# Patient Record
Sex: FEMALE | Race: WHITE | NOT HISPANIC OR LATINO | URBAN - METROPOLITAN AREA
[De-identification: names, ages, dates, MRNs, and addresses within clinical notes are randomized per-mention and may not be internally consistent; named-entity substitution may affect disease eponyms.]

---

## 2020-04-15 ENCOUNTER — INPATIENT (INPATIENT)
Facility: HOSPITAL | Age: 80
LOS: 5 days | Discharge: NURSING, REHAB OR LONG-TERM CARE | End: 2020-04-21
Attending: INTERNAL MEDICINE | Admitting: INTERNAL MEDICINE
Payer: MEDICARE

## 2020-04-15 VITALS
HEART RATE: 71 BPM | RESPIRATION RATE: 22 BRPM | OXYGEN SATURATION: 96 % | SYSTOLIC BLOOD PRESSURE: 122 MMHG | DIASTOLIC BLOOD PRESSURE: 51 MMHG | TEMPERATURE: 98 F

## 2020-04-15 LAB
ALBUMIN SERPL ELPH-MCNC: 3.6 G/DL — SIGNIFICANT CHANGE UP (ref 3.5–5.2)
ALP SERPL-CCNC: 51 U/L — SIGNIFICANT CHANGE UP (ref 30–115)
ALT FLD-CCNC: <5 U/L — SIGNIFICANT CHANGE UP (ref 0–41)
ANION GAP SERPL CALC-SCNC: 10 MMOL/L — SIGNIFICANT CHANGE UP (ref 7–14)
AST SERPL-CCNC: 20 U/L — SIGNIFICANT CHANGE UP (ref 0–41)
BASOPHILS # BLD AUTO: 0.05 K/UL — SIGNIFICANT CHANGE UP (ref 0–0.2)
BASOPHILS NFR BLD AUTO: 0.4 % — SIGNIFICANT CHANGE UP (ref 0–1)
BILIRUB SERPL-MCNC: 0.2 MG/DL — SIGNIFICANT CHANGE UP (ref 0.2–1.2)
BUN SERPL-MCNC: 12 MG/DL — SIGNIFICANT CHANGE UP (ref 10–20)
CALCIUM SERPL-MCNC: 9.2 MG/DL — SIGNIFICANT CHANGE UP (ref 8.5–10.1)
CHLORIDE SERPL-SCNC: 102 MMOL/L — SIGNIFICANT CHANGE UP (ref 98–110)
CO2 SERPL-SCNC: 25 MMOL/L — SIGNIFICANT CHANGE UP (ref 17–32)
CREAT SERPL-MCNC: 0.8 MG/DL — SIGNIFICANT CHANGE UP (ref 0.7–1.5)
D DIMER BLD IA.RAPID-MCNC: 485 NG/ML DDU — HIGH (ref 0–230)
EOSINOPHIL # BLD AUTO: 0.18 K/UL — SIGNIFICANT CHANGE UP (ref 0–0.7)
EOSINOPHIL NFR BLD AUTO: 1.6 % — SIGNIFICANT CHANGE UP (ref 0–8)
GLUCOSE SERPL-MCNC: 80 MG/DL — SIGNIFICANT CHANGE UP (ref 70–99)
HCT VFR BLD CALC: 33.1 % — LOW (ref 37–47)
HGB BLD-MCNC: 10.9 G/DL — LOW (ref 12–16)
IMM GRANULOCYTES NFR BLD AUTO: 1 % — HIGH (ref 0.1–0.3)
LYMPHOCYTES # BLD AUTO: 1.54 K/UL — SIGNIFICANT CHANGE UP (ref 1.2–3.4)
LYMPHOCYTES # BLD AUTO: 13.8 % — LOW (ref 20.5–51.1)
MCHC RBC-ENTMCNC: 32 PG — HIGH (ref 27–31)
MCHC RBC-ENTMCNC: 32.9 G/DL — SIGNIFICANT CHANGE UP (ref 32–37)
MCV RBC AUTO: 97.1 FL — SIGNIFICANT CHANGE UP (ref 81–99)
MONOCYTES # BLD AUTO: 1.07 K/UL — HIGH (ref 0.1–0.6)
MONOCYTES NFR BLD AUTO: 9.6 % — HIGH (ref 1.7–9.3)
NEUTROPHILS # BLD AUTO: 8.22 K/UL — HIGH (ref 1.4–6.5)
NEUTROPHILS NFR BLD AUTO: 73.6 % — SIGNIFICANT CHANGE UP (ref 42.2–75.2)
NRBC # BLD: 0 /100 WBCS — SIGNIFICANT CHANGE UP (ref 0–0)
NT-PROBNP SERPL-SCNC: 100 PG/ML — SIGNIFICANT CHANGE UP (ref 0–300)
PLATELET # BLD AUTO: 410 K/UL — HIGH (ref 130–400)
POTASSIUM SERPL-MCNC: 5.4 MMOL/L — HIGH (ref 3.5–5)
POTASSIUM SERPL-SCNC: 5.4 MMOL/L — HIGH (ref 3.5–5)
PROT SERPL-MCNC: 6.5 G/DL — SIGNIFICANT CHANGE UP (ref 6–8)
RBC # BLD: 3.41 M/UL — LOW (ref 4.2–5.4)
RBC # FLD: 12.5 % — SIGNIFICANT CHANGE UP (ref 11.5–14.5)
SODIUM SERPL-SCNC: 137 MMOL/L — SIGNIFICANT CHANGE UP (ref 135–146)
TROPONIN T SERPL-MCNC: <0.01 NG/ML — SIGNIFICANT CHANGE UP
WBC # BLD: 11.17 K/UL — HIGH (ref 4.8–10.8)
WBC # FLD AUTO: 11.17 K/UL — HIGH (ref 4.8–10.8)

## 2020-04-15 PROCEDURE — 93010 ELECTROCARDIOGRAM REPORT: CPT

## 2020-04-15 PROCEDURE — 99223 1ST HOSP IP/OBS HIGH 75: CPT

## 2020-04-15 PROCEDURE — 99497 ADVNCD CARE PLAN 30 MIN: CPT | Mod: 25

## 2020-04-15 PROCEDURE — 71045 X-RAY EXAM CHEST 1 VIEW: CPT | Mod: 26

## 2020-04-15 PROCEDURE — 99285 EMERGENCY DEPT VISIT HI MDM: CPT

## 2020-04-15 RX ORDER — AMLODIPINE BESYLATE 2.5 MG/1
10 TABLET ORAL DAILY
Refills: 0 | Status: DISCONTINUED | OUTPATIENT
Start: 2020-04-15 | End: 2020-04-21

## 2020-04-15 RX ORDER — AZITHROMYCIN 500 MG/1
500 TABLET, FILM COATED ORAL ONCE
Refills: 0 | Status: COMPLETED | OUTPATIENT
Start: 2020-04-15 | End: 2020-04-15

## 2020-04-15 RX ORDER — LEVOTHYROXINE SODIUM 125 MCG
50 TABLET ORAL DAILY
Refills: 0 | Status: DISCONTINUED | OUTPATIENT
Start: 2020-04-15 | End: 2020-04-21

## 2020-04-15 RX ORDER — HYDROXYCHLOROQUINE SULFATE 200 MG
800 TABLET ORAL EVERY 24 HOURS
Refills: 0 | Status: COMPLETED | OUTPATIENT
Start: 2020-04-15 | End: 2020-04-15

## 2020-04-15 RX ORDER — HYDROXYCHLOROQUINE SULFATE 200 MG
TABLET ORAL
Refills: 0 | Status: DISCONTINUED | OUTPATIENT
Start: 2020-04-15 | End: 2020-04-15

## 2020-04-15 RX ORDER — PANTOPRAZOLE SODIUM 20 MG/1
40 TABLET, DELAYED RELEASE ORAL
Refills: 0 | Status: DISCONTINUED | OUTPATIENT
Start: 2020-04-15 | End: 2020-04-21

## 2020-04-15 RX ORDER — ARIPIPRAZOLE 15 MG/1
30 TABLET ORAL DAILY
Refills: 0 | Status: DISCONTINUED | OUTPATIENT
Start: 2020-04-15 | End: 2020-04-21

## 2020-04-15 RX ORDER — CEFTRIAXONE 500 MG/1
1000 INJECTION, POWDER, FOR SOLUTION INTRAMUSCULAR; INTRAVENOUS EVERY 24 HOURS
Refills: 0 | Status: DISCONTINUED | OUTPATIENT
Start: 2020-04-15 | End: 2020-04-17

## 2020-04-15 RX ORDER — AZITHROMYCIN 500 MG/1
500 TABLET, FILM COATED ORAL EVERY 24 HOURS
Refills: 0 | Status: DISCONTINUED | OUTPATIENT
Start: 2020-04-16 | End: 2020-04-16

## 2020-04-15 RX ORDER — ENOXAPARIN SODIUM 100 MG/ML
40 INJECTION SUBCUTANEOUS
Refills: 0 | Status: DISCONTINUED | OUTPATIENT
Start: 2020-04-15 | End: 2020-04-15

## 2020-04-15 RX ORDER — CHLORHEXIDINE GLUCONATE 213 G/1000ML
1 SOLUTION TOPICAL
Refills: 0 | Status: DISCONTINUED | OUTPATIENT
Start: 2020-04-15 | End: 2020-04-21

## 2020-04-15 RX ORDER — ENOXAPARIN SODIUM 100 MG/ML
40 INJECTION SUBCUTANEOUS DAILY
Refills: 0 | Status: DISCONTINUED | OUTPATIENT
Start: 2020-04-15 | End: 2020-04-21

## 2020-04-15 RX ORDER — HYDROXYCHLOROQUINE SULFATE 200 MG
TABLET ORAL
Refills: 0 | Status: DISCONTINUED | OUTPATIENT
Start: 2020-04-15 | End: 2020-04-16

## 2020-04-15 RX ORDER — HYDROXYCHLOROQUINE SULFATE 200 MG
400 TABLET ORAL EVERY 24 HOURS
Refills: 0 | Status: DISCONTINUED | OUTPATIENT
Start: 2020-04-16 | End: 2020-04-16

## 2020-04-15 RX ORDER — LITHIUM CARBONATE 300 MG/1
150 TABLET, EXTENDED RELEASE ORAL DAILY
Refills: 0 | Status: DISCONTINUED | OUTPATIENT
Start: 2020-04-15 | End: 2020-04-21

## 2020-04-15 RX ORDER — LAMOTRIGINE 25 MG/1
25 TABLET, ORALLY DISINTEGRATING ORAL DAILY
Refills: 0 | Status: DISCONTINUED | OUTPATIENT
Start: 2020-04-15 | End: 2020-04-21

## 2020-04-15 RX ORDER — HYDROXYCHLOROQUINE SULFATE 200 MG
800 TABLET ORAL EVERY 24 HOURS
Refills: 0 | Status: DISCONTINUED | OUTPATIENT
Start: 2020-04-15 | End: 2020-04-15

## 2020-04-15 RX ORDER — AZITHROMYCIN 500 MG/1
TABLET, FILM COATED ORAL
Refills: 0 | Status: DISCONTINUED | OUTPATIENT
Start: 2020-04-15 | End: 2020-04-16

## 2020-04-15 RX ORDER — BUDESONIDE AND FORMOTEROL FUMARATE DIHYDRATE 160; 4.5 UG/1; UG/1
2 AEROSOL RESPIRATORY (INHALATION)
Refills: 0 | Status: DISCONTINUED | OUTPATIENT
Start: 2020-04-15 | End: 2020-04-21

## 2020-04-15 RX ADMIN — Medication 800 MILLIGRAM(S): at 21:42

## 2020-04-15 RX ADMIN — AZITHROMYCIN 255 MILLIGRAM(S): 500 TABLET, FILM COATED ORAL at 23:00

## 2020-04-15 RX ADMIN — CEFTRIAXONE 100 MILLIGRAM(S): 500 INJECTION, POWDER, FOR SOLUTION INTRAMUSCULAR; INTRAVENOUS at 21:25

## 2020-04-15 RX ADMIN — BUDESONIDE AND FORMOTEROL FUMARATE DIHYDRATE 2 PUFF(S): 160; 4.5 AEROSOL RESPIRATORY (INHALATION) at 21:42

## 2020-04-15 NOTE — ED PROVIDER NOTE - NS ED ROS FT
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

## 2020-04-15 NOTE — H&P ADULT - HISTORY OF PRESENT ILLNESS
78 yo female from [facility] PMH multiple problems including  COPD not on home o2, IBD, GERD, dementia, bipolar, schizophrenia, hypothyroidism, HLD, constipation, insomnia, anemia BIBEMS for evaluation of SOB and " gasping for air" for several days.  Patient reports symptoms at rest and on ambulation.  Denies fever, chills cough, CP, back or abdominal pain, no diarrhea or urinary complaints, no leg pain or swelling.

## 2020-04-15 NOTE — GOALS OF CARE CONVERSATION - ADVANCED CARE PLANNING - CONVERSATION DETAILS
Goals of care discussed in light of patient's comorbidities and presenting symptoms, patient wishes to be full code, agreeable to intubation/mechanical ventilation should respiratory status decompensate. Patient is alert and oriented x4, unable to contact patient's niece Skye Verma, no contact number listed.

## 2020-04-15 NOTE — H&P ADULT - ATTENDING COMMENTS
PHYSICAL EXAM:    CONSTITUTIONAL: NAD,   ENMT: EOMI, PERRLA, No tonsillar erythema, exudates, or enlargement, neck supple, No JVD  PSYCH: Alert & Oriented X3, denies suicidal or homicidal ideation, however expresses paranoid delusion stating "many people" are "out to" get her in light of her wealth (states she is a millionaire)   RESPIRATORY: Left basilar rales, negative wheezing   CARDIOVASCULAR: Regular rate and rhythm; No murmurs, rubs, or gallops, negative edema  GASTROINTESTINAL: Soft, Nontender, Nondistended; Bowel sounds present  EXTREMITIES:  2+ Peripheral Pulses, No clubbing, cyanosis  SKIN: No rashes or lesions    80 yo female from [facility] PMH multiple problems including  COPD not on home o2, IBD, GERD, dementia, bipolar, schizophrenia, hypothyroidism, HLD, constipation, insomnia, anemia BIBEMS for evaluation of SOB and " gasping for air" for several days.  Patient reports symptoms at rest and on ambulation.  Denies fever, chills cough, CP, back or abdominal pain, no diarrhea or urinary complaints, no leg pain or swelling.    80 yo female from [facility] PMH multiple problems including  COPD not on home o2, IBD, GERD, dementia, bipolar, schizophrenia, hypothyroidism, HLD, constipation, insomnia, anemia BIBEMS for evaluation of SOB and " gasping for air" for several days.    # Suspected COVID 19 infection  - f/u covid 19 swab  - L basilar opacity on cxr - ? bacterial pneumonia ? will await PCT level prior to initiating antibiotics  - start plaquenil ?    # PMH of COPD not on home o2, IBD, GERD, dementia, bipolar, schizophrenia, hypothyroidism, HLD, constipation, insomnia, anemia  - c/w home meds    # DVTppx: Lovenox  # GI ppx: PPI  # Full code  # Admit to non-icu covid unit PHYSICAL EXAM:    CONSTITUTIONAL: NAD,   ENMT: EOMI, PERRLA, No tonsillar erythema, exudates, or enlargement, neck supple, No JVD  PSYCH: Alert & Oriented X3, denies suicidal or homicidal ideation, however expresses paranoid delusion stating "many people" are "out to" get her in light of her wealth (states she is a millionaire)   RESPIRATORY: Left basilar rales, negative wheezing   CARDIOVASCULAR: Regular rate and rhythm; No murmurs, rubs, or gallops, negative edema  GASTROINTESTINAL: Soft, Nontender, Nondistended; Bowel sounds present  EXTREMITIES:  2+ Peripheral Pulses, No clubbing, cyanosis  SKIN: No rashes or lesions    78 yo F, from [facility], with PMHx of COPD not on home oxygen, IBD, GERD, Dementia?, Bipolar, Schizophrenia, Hypothyroidism, Insomnia, Chronic Normocytic Anemia brought in from facility with complaint of shortness of breath and noted episodes of patient "gasping for air" for few days duration. Patient comfortable on room air, however becomes dyspneic on ambulation. Patient denies fever, chills, nausea, vomiting, cough, chest pain, abdominal pain, known sick contacts or recent travel. 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      #Suspected Viral Pneumonia vs. gram negative HCAP: COVID-19 sent, patient remains comfortable and saturating 97% on room air, able to speak in full and complete sentences with no sign of labored breathing, CXR revealing of left basilar opacity, positive leukocytosis, afebrile, continue Ceftriaxone, Azithromycin, started on Hydroxychlorquine EKG NSR QTc 411, incentive spirometry, pulmonary toilet, f/u immune hyperactivation panel, urine legionella, and strep antigen    #COPD no sign of concurrent exacerbation, GERD, Bipolar, Schizophrenia, Hypothyroidism/HLD/Constipation/Insomnia: Continue home medications      Disposition: Return to Assisted Living Facility once medically stable.

## 2020-04-15 NOTE — ED PROVIDER NOTE - CLINICAL SUMMARY MEDICAL DECISION MAKING FREE TEXT BOX
79n yo female with SOB for several days, symptoms suspicious for COVID, saturating well on RA, but becomes dyspneic on ambulation.  Admit.

## 2020-04-15 NOTE — ED ADULT NURSE NOTE - OBJECTIVE STATEMENT
78 y/o female BIBA from [facility] for shortness of breat with difficulty ambulating. patient states she has been short of breath for 4 days with increased swelling to bilateral ankles. patient has a hx of schizophrenia appears to be having visual hallucinations at this time "There are taser in my room hitting my ankle to make it blow up". patient is not suicidal or homicidal at this time. patient denies chest pain, dizziness, headache, fever, chills, abdominal pain.

## 2020-04-15 NOTE — ED PROVIDER NOTE - PHYSICAL EXAMINATION
Vital Signs: I have reviewed the initial vital signs.  Constitutional: well-nourished, appears stated age, no acute distress  HEENT: PERRL, pink conjunctivae  Cardiovascular: regular rate, regular rhythm, well-perfused extremities, distal pulses intact  Respiratory: unlabored respiratory effort, equal air entry b/l, inspiratory crackles bilaterally  Gastrointestinal: soft, non-tender abdomen, no pulsatile or palpable mass, no CVA ttp  Musculoskeletal: supple neck, no lower extremity edema or calf ttp, no midline spine ttp  Integumentary: warm, dry, no rash  Neurologic: awake, alert x3, no gross neuro deficits  Psychiatric: appropriate mood, appropriate affect, somewhat unkempt appearance, see ROS for delusions

## 2020-04-15 NOTE — ED ADULT NURSE NOTE - PMH
Anemia    Bipolar 1 disorder    COPD (chronic obstructive pulmonary disease)    Dementia    GERD (gastroesophageal reflux disease)    Hypercholesterolemia    Hypothyroid    Insomnia    Schizophrenia

## 2020-04-15 NOTE — ED ADULT NURSE NOTE - NSIMPLEMENTINTERV_GEN_ALL_ED
Implemented All Fall with Harm Risk Interventions:  [place] to call system. Call bell, personal items and telephone within reach. Instruct patient to call for assistance. Room bathroom lighting operational. Non-slip footwear when patient is off stretcher. Physically safe environment: no spills, clutter or unnecessary equipment. Stretcher in lowest position, wheels locked, appropriate side rails in place. Provide visual cue, wrist band, yellow gown, etc. Monitor gait and stability. Monitor for mental status changes and reorient to person, place, and time. Review medications for side effects contributing to fall risk. Reinforce activity limits and safety measures with patient and family. Provide visual clues: red socks.

## 2020-04-15 NOTE — H&P ADULT - NSHPLABSRESULTS_GEN_ALL_CORE
LABS:                        10.9   11.17 )-----------( 410      ( 15 Apr 2020 14:20 )             33.1     04-15    137  |  102  |  12  ----------------------------<  80  5.4<H>   |  25  |  0.8    Ca    9.2      15 Apr 2020 14:20    TPro  6.5  /  Alb  3.6  /  TBili  0.2  /  DBili  x   /  AST  20  /  ALT  <5  /  AlkPhos  51  04-15          Troponin T, Serum: <0.01 ng/mL (04-15-20 @ 14:20)      CARDIAC MARKERS ( 15 Apr 2020 14:20 )  x     / <0.01 ng/mL / x     / x     / x          RADIOLOGY:

## 2020-04-15 NOTE — ED ADULT NURSE NOTE - CHIEF COMPLAINT QUOTE
Pt presents to ED KATY from [facility] for c/o increasing SOB on exertion x 1 week, BLLE swelling noted. Pt denies fevers or coughs.

## 2020-04-15 NOTE — ED ADULT TRIAGE NOTE - CHIEF COMPLAINT QUOTE
Pt presents to ED KATY from [facility] for c/o increasing SOB on exertion x 1 week, BLLE swelling noted. Pt denies fevers or coughs.

## 2020-04-15 NOTE — H&P ADULT - NSICDXPASTMEDICALHX_GEN_ALL_CORE_FT
PAST MEDICAL HISTORY:  Anemia     Bipolar 1 disorder     COPD (chronic obstructive pulmonary disease)     Dementia     GERD (gastroesophageal reflux disease)     Hypercholesterolemia     Hypothyroid     Insomnia     Schizophrenia

## 2020-04-15 NOTE — H&P ADULT - ASSESSMENT
78 yo female from [facility] PMH multiple problems including  COPD not on home o2, IBD, GERD, dementia, bipolar, schizophrenia, hypothyroidism, HLD, constipation, insomnia, anemia BIBEMS for evaluation of SOB and " gasping for air" for several days.    # Suspected COVID 19 infection  - f/u covid 19 swab  - L basilar opacity on cxr - ? bacterial pneumonia ? will await PCT level prior to initiating antibiotics  - start plaquenil ?    # PMH of COPD not on home o2, IBD, GERD, dementia, bipolar, schizophrenia, hypothyroidism, HLD, constipation, insomnia, anemia  - c/w home meds    # DVTppx: Lovenox  # GI ppx: PPI  # Full code  # Admit to non-icu covid unit

## 2020-04-15 NOTE — ED PROVIDER NOTE - OBJECTIVE STATEMENT
78 yo female from [facility] PMH multiple problems including  COPD not on home o2, IBD, GERD, dementia, bipolar, schizophrenia, hypothyroidism, HLD, constipation, insomnia, anemia BIBEMS for evaluation of SOB and " gasping for air" for several days.  Patient reports symptoms at rest and on ambulation.  Denies fever, chills cough, CP, back or abdominal pain, no diarrhea or urinary complaints, no leg pain or swelling.  Will get CXR, check labs, ECG, COVID testing, dispo accordingly.

## 2020-04-16 LAB
ALBUMIN SERPL ELPH-MCNC: 3.5 G/DL — SIGNIFICANT CHANGE UP (ref 3.5–5.2)
ALP SERPL-CCNC: 54 U/L — SIGNIFICANT CHANGE UP (ref 30–115)
ALT FLD-CCNC: <5 U/L — SIGNIFICANT CHANGE UP (ref 0–41)
ANION GAP SERPL CALC-SCNC: 11 MMOL/L — SIGNIFICANT CHANGE UP (ref 7–14)
AST SERPL-CCNC: 12 U/L — SIGNIFICANT CHANGE UP (ref 0–41)
BASOPHILS # BLD AUTO: 0.04 K/UL — SIGNIFICANT CHANGE UP (ref 0–0.2)
BASOPHILS NFR BLD AUTO: 0.3 % — SIGNIFICANT CHANGE UP (ref 0–1)
BILIRUB SERPL-MCNC: <0.2 MG/DL — SIGNIFICANT CHANGE UP (ref 0.2–1.2)
BUN SERPL-MCNC: 11 MG/DL — SIGNIFICANT CHANGE UP (ref 10–20)
CALCIUM SERPL-MCNC: 9.2 MG/DL — SIGNIFICANT CHANGE UP (ref 8.5–10.1)
CHLORIDE SERPL-SCNC: 105 MMOL/L — SIGNIFICANT CHANGE UP (ref 98–110)
CO2 SERPL-SCNC: 25 MMOL/L — SIGNIFICANT CHANGE UP (ref 17–32)
CREAT SERPL-MCNC: 0.8 MG/DL — SIGNIFICANT CHANGE UP (ref 0.7–1.5)
CRP SERPL-MCNC: 0.17 MG/DL — SIGNIFICANT CHANGE UP (ref 0–0.4)
EOSINOPHIL # BLD AUTO: 0.25 K/UL — SIGNIFICANT CHANGE UP (ref 0–0.7)
EOSINOPHIL NFR BLD AUTO: 2 % — SIGNIFICANT CHANGE UP (ref 0–8)
FERRITIN SERPL-MCNC: 445 NG/ML — HIGH (ref 15–150)
GLUCOSE SERPL-MCNC: 93 MG/DL — SIGNIFICANT CHANGE UP (ref 70–99)
HCT VFR BLD CALC: 34.1 % — LOW (ref 37–47)
HGB BLD-MCNC: 10.9 G/DL — LOW (ref 12–16)
IMM GRANULOCYTES NFR BLD AUTO: 1 % — HIGH (ref 0.1–0.3)
LYMPHOCYTES # BLD AUTO: 16 % — LOW (ref 20.5–51.1)
LYMPHOCYTES # BLD AUTO: 2 K/UL — SIGNIFICANT CHANGE UP (ref 1.2–3.4)
MCHC RBC-ENTMCNC: 31 PG — SIGNIFICANT CHANGE UP (ref 27–31)
MCHC RBC-ENTMCNC: 32 G/DL — SIGNIFICANT CHANGE UP (ref 32–37)
MCV RBC AUTO: 96.9 FL — SIGNIFICANT CHANGE UP (ref 81–99)
MONOCYTES # BLD AUTO: 1.1 K/UL — HIGH (ref 0.1–0.6)
MONOCYTES NFR BLD AUTO: 8.8 % — SIGNIFICANT CHANGE UP (ref 1.7–9.3)
NEUTROPHILS # BLD AUTO: 9.01 K/UL — HIGH (ref 1.4–6.5)
NEUTROPHILS NFR BLD AUTO: 71.9 % — SIGNIFICANT CHANGE UP (ref 42.2–75.2)
NRBC # BLD: 0 /100 WBCS — SIGNIFICANT CHANGE UP (ref 0–0)
PLATELET # BLD AUTO: 457 K/UL — HIGH (ref 130–400)
POTASSIUM SERPL-MCNC: 4 MMOL/L — SIGNIFICANT CHANGE UP (ref 3.5–5)
POTASSIUM SERPL-SCNC: 4 MMOL/L — SIGNIFICANT CHANGE UP (ref 3.5–5)
PROCALCITONIN SERPL-MCNC: 0.05 NG/ML — SIGNIFICANT CHANGE UP (ref 0.02–0.1)
PROT SERPL-MCNC: 6.1 G/DL — SIGNIFICANT CHANGE UP (ref 6–8)
RBC # BLD: 3.52 M/UL — LOW (ref 4.2–5.4)
RBC # FLD: 12.5 % — SIGNIFICANT CHANGE UP (ref 11.5–14.5)
SARS-COV-2 RNA SPEC QL NAA+PROBE: DETECTED
SODIUM SERPL-SCNC: 141 MMOL/L — SIGNIFICANT CHANGE UP (ref 135–146)
WBC # BLD: 12.53 K/UL — HIGH (ref 4.8–10.8)
WBC # FLD AUTO: 12.53 K/UL — HIGH (ref 4.8–10.8)

## 2020-04-16 PROCEDURE — 99233 SBSQ HOSP IP/OBS HIGH 50: CPT

## 2020-04-16 RX ORDER — HYDROXYCHLOROQUINE SULFATE 200 MG
400 TABLET ORAL EVERY 24 HOURS
Refills: 0 | Status: COMPLETED | OUTPATIENT
Start: 2020-04-16 | End: 2020-04-19

## 2020-04-16 RX ORDER — AZITHROMYCIN 500 MG/1
250 TABLET, FILM COATED ORAL DAILY
Refills: 0 | Status: DISCONTINUED | OUTPATIENT
Start: 2020-04-16 | End: 2020-04-16

## 2020-04-16 RX ADMIN — CEFTRIAXONE 100 MILLIGRAM(S): 500 INJECTION, POWDER, FOR SOLUTION INTRAMUSCULAR; INTRAVENOUS at 19:58

## 2020-04-16 RX ADMIN — ENOXAPARIN SODIUM 40 MILLIGRAM(S): 100 INJECTION SUBCUTANEOUS at 14:26

## 2020-04-16 RX ADMIN — CHLORHEXIDINE GLUCONATE 1 APPLICATION(S): 213 SOLUTION TOPICAL at 05:51

## 2020-04-16 RX ADMIN — Medication 400 MILLIGRAM(S): at 14:47

## 2020-04-16 RX ADMIN — PANTOPRAZOLE SODIUM 40 MILLIGRAM(S): 20 TABLET, DELAYED RELEASE ORAL at 05:53

## 2020-04-16 RX ADMIN — Medication 50 MICROGRAM(S): at 06:27

## 2020-04-16 RX ADMIN — AMLODIPINE BESYLATE 10 MILLIGRAM(S): 2.5 TABLET ORAL at 05:51

## 2020-04-16 RX ADMIN — BUDESONIDE AND FORMOTEROL FUMARATE DIHYDRATE 2 PUFF(S): 160; 4.5 AEROSOL RESPIRATORY (INHALATION) at 19:58

## 2020-04-16 RX ADMIN — LITHIUM CARBONATE 150 MILLIGRAM(S): 300 TABLET, EXTENDED RELEASE ORAL at 14:25

## 2020-04-16 RX ADMIN — BUDESONIDE AND FORMOTEROL FUMARATE DIHYDRATE 2 PUFF(S): 160; 4.5 AEROSOL RESPIRATORY (INHALATION) at 14:27

## 2020-04-16 RX ADMIN — LAMOTRIGINE 25 MILLIGRAM(S): 25 TABLET, ORALLY DISINTEGRATING ORAL at 14:24

## 2020-04-16 RX ADMIN — ARIPIPRAZOLE 30 MILLIGRAM(S): 15 TABLET ORAL at 14:24

## 2020-04-16 NOTE — PROGRESS NOTE ADULT - SUBJECTIVE AND OBJECTIVE BOX
JB OLIVEIRA  79y  Female      Patient is a 79y old  Female who presents with a chief complaint of Shortness of breath (16 Apr 2020 09:04)      INTERVAL HPI/OVERNIGHT EVENTS:  She feels better, still with cough and SOB.   Vital Signs Last 24 Hrs  T(C): 36.7 (16 Apr 2020 13:11), Max: 37.3 (15 Apr 2020 21:30)  T(F): 98.1 (16 Apr 2020 13:11), Max: 99.2 (15 Apr 2020 21:30)  HR: 91 (16 Apr 2020 13:11) (66 - 91)  BP: 108/71 (16 Apr 2020 13:11) (101/53 - 137/64)  BP(mean): --  RR: 18 (16 Apr 2020 13:11) (17 - 20)  SpO2: 95% (16 Apr 2020 05:30) (94% - 99%)      04-16-20 @ 07:01  -  04-16-20 @ 15:01  --------------------------------------------------------  IN: 210 mL / OUT: 0 mL / NET: 210 mL            Consultant(s) Notes Reviewed:  [x ] YES  [ ] NO          MEDICATIONS  (STANDING):  amLODIPine   Tablet 10 milliGRAM(s) Oral daily  ARIPiprazole 30 milliGRAM(s) Oral daily  budesonide 160 MICROgram(s)/formoterol 4.5 MICROgram(s) Inhaler 2 Puff(s) Inhalation two times a day  cefTRIAXone   IVPB 1000 milliGRAM(s) IV Intermittent every 24 hours  chlorhexidine 4% Liquid 1 Application(s) Topical <User Schedule>  enoxaparin Injectable 40 milliGRAM(s) SubCutaneous daily  hydroxychloroquine 400 milliGRAM(s) Oral every 24 hours  lamoTRIgine 25 milliGRAM(s) Oral daily  levothyroxine 50 MICROGram(s) Oral daily  lithium 150 milliGRAM(s) Oral daily  pantoprazole    Tablet 40 milliGRAM(s) Oral before breakfast    MEDICATIONS  (PRN):      LABS                          10.9   12.53 )-----------( 457      ( 16 Apr 2020 05:40 )             34.1     04-16    141  |  105  |  11  ----------------------------<  93  4.0   |  25  |  0.8    Ca    9.2      16 Apr 2020 05:40    TPro  6.1  /  Alb  3.5  /  TBili  <0.2  /  DBili  x   /  AST  12  /  ALT  <5  /  AlkPhos  54  04-16          Lactate Trend    CARDIAC MARKERS ( 15 Apr 2020 14:20 )  x     / <0.01 ng/mL / x     / x     / x          CAPILLARY BLOOD GLUCOSE            RADIOLOGY & ADDITIONAL TESTS:    Imaging Personally Reviewed:  [ ] YES  [ ] NO    HEALTH ISSUES - PROBLEM Dx:        PHYSICAL EXAM:  GENERAL: NAD, well-developed  HEAD:  Atraumatic, Normocephalic  EYES: EOMI, PERRLA, conjunctiva and sclera clear  NECK: Supple, No JVD  CHEST/LUNG: bilateral rales.   HEART: Regular rate and rhythm; S1 S2  ABDOMEN: Soft, Nontender, Nondistended; Bowel sounds present  EXTREMITIES:  2+ Peripheral Pulses, No clubbing, cyanosis, or edema  PSYCH: AAOx3  NEUROLOGY: non-focal  SKIN: No rashes or lesions

## 2020-04-16 NOTE — PROGRESS NOTE ADULT - ASSESSMENT
80 yo female from [facility] assisted living facility PMH multiple problems including  COPD not on home o2, IBD, GERD, dementia, bipolar, schizophrenia, hypothyroidism, HLD, constipation, insomnia, anemia BIBEMS for evaluation of SOB and " gasping for air" for several days.    A/P:   Pneumonia: to rule out COVI-19  No hypoxia.   CXR showed left basilar opacity.   Continue Plaquenil, Ceftriaxone, If negative will treat as community acquired pneumonia. (add Zithromax).       COPD: stable, no wheezing  Continue Symbicort. Albuterol MDI as needed.       Bipolar, Schizophrenia  Continue Abilify, Lamotrigine and Lithium.     Hypothyroidism  Continue Synthroid.      DVTppx: Lovenox  GI ppx: PPI  Full code    #Progress Note Handoff:  Pending (specify):  Covid result.   Family discussion:  Disposition: psych facility

## 2020-04-16 NOTE — PROGRESS NOTE ADULT - ASSESSMENT
78 yo female from [facility] PMH multiple problems including  COPD not on home o2, IBD, GERD, dementia, bipolar, schizophrenia, hypothyroidism, HLD, constipation, insomnia, anemia BIBEMS for evaluation of SOB and " gasping for air" for several days.    # Suspected COVID 19 infection  - patient currently saturating SPO2 96% room air  - f/u covid 19 swab  - CXR: with left basilar opacity  - elevated wbc, thrombocytosis, no lymphopenia  - Procalcitonin 0.05, CRP 0.17, ferritin 445      # PMH of COPD not on home o2, IBD, GERD, dementia, bipolar, schizophrenia, hypothyroidism, HLD, constipation, insomnia, anemia  - c/w home meds    # DVTppx: Lovenox  # GI ppx: PPI  # Full code  # Admit to non-icu covid unit 78 yo female from [facility] PMH multiple problems including  COPD not on home o2, IBD, GERD, dementia, bipolar, schizophrenia, hypothyroidism, HLD, constipation, insomnia, anemia BIBEMS for evaluation of SOB and " gasping for air" for several days.     Suspected COVID 19 infection  - patient currently saturating SPO2 96% room air  - f/u Covid 19 swab  - CXR: with left basilar opacity  - elevated wbc, thrombocytosis, no lymphopenia  - Procalcitonin 0.05, CRP 0.17, ferritin 445  - , Plaquenil 1/5  - continue with ceftriaxone  - tylenol PRN for fevers  - c/w tessalon  - f/u urinalysis, blood culture    PMH of COPD, stable  - not on home o2  - c/w SYMBICORT    IBD, GERD  - c/w ppx    Bipolar, Schizophrenia  - c/w Abilify   - c/w lamotrigine  - c/w lithium    Hypothyroidism  - c/w syntheroid     HLD,   - c/w statin    Constipation  - c/w bowel regimen   Insomnia, anemia  - c/w home meds    DVTppx: Lovenox  GI ppx: PPI  Full code

## 2020-04-16 NOTE — PROGRESS NOTE ADULT - SUBJECTIVE AND OBJECTIVE BOX
JB OLIVEIRA 79y Female  MRN#: [id number]   CODE STATUS:________      SUBJECTIVE  Patient is a 79y old Female who presents with a chief complaint of Shortness of breath (15 Apr 2020 18:39)  Currently admitted to medicine with the primary diagnosis of Shortness of breath    Today is hospital day 1d, and this morning she is resting in bed, alert and oriented x2, claims that she wants to go home and has "important matters to take care of". Patient currently on room air with appropriate saturation, hemodynamically stable, denies any acute dyspnea, nausea, vomiting, or abdominal discomfort.       OBJECTIVE  PAST MEDICAL & SURGICAL HISTORY  Anemia  Insomnia  Hypercholesterolemia  Dementia  Hypothyroid  GERD (gastroesophageal reflux disease)  COPD (chronic obstructive pulmonary disease)  Schizophrenia  Bipolar 1 disorder    ALLERGIES:  Milk (Unknown)  Risperdal (Unknown)  risperidone (Unknown)    MEDICATIONS:  STANDING MEDICATIONS  amLODIPine   Tablet 10 milliGRAM(s) Oral daily  ARIPiprazole 30 milliGRAM(s) Oral daily  azithromycin  IVPB      azithromycin  IVPB 500 milliGRAM(s) IV Intermittent every 24 hours  budesonide 160 MICROgram(s)/formoterol 4.5 MICROgram(s) Inhaler 2 Puff(s) Inhalation two times a day  cefTRIAXone   IVPB 1000 milliGRAM(s) IV Intermittent every 24 hours  chlorhexidine 4% Liquid 1 Application(s) Topical <User Schedule>  enoxaparin Injectable 40 milliGRAM(s) SubCutaneous daily  hydroxychloroquine 400 milliGRAM(s) Oral every 24 hours  lamoTRIgine 25 milliGRAM(s) Oral daily  levothyroxine 50 MICROGram(s) Oral daily  lithium 150 milliGRAM(s) Oral daily  pantoprazole    Tablet 40 milliGRAM(s) Oral before breakfast    PRN MEDICATIONS      VITAL SIGNS: Last 24 Hours  T(C): 36 (16 Apr 2020 05:30), Max: 37.3 (15 Apr 2020 21:30)  T(F): 96.8 (16 Apr 2020 05:30), Max: 99.2 (15 Apr 2020 21:30)  HR: 80 (16 Apr 2020 05:30) (66 - 80)  BP: 130/60 (16 Apr 2020 05:30) (101/53 - 137/64)  BP(mean): --  RR: 18 (16 Apr 2020 05:30) (17 - 22)  SpO2: 95% (16 Apr 2020 05:30) (94% - 99%)    LABS:                        10.9   12.53 )-----------( 457      ( 16 Apr 2020 05:40 )             34.1     04-16    141  |  105  |  11  ----------------------------<  93  4.0   |  25  |  0.8    Ca    9.2      16 Apr 2020 05:40    TPro  6.1  /  Alb  3.5  /  TBili  <0.2  /  DBili  x   /  AST  12  /  ALT  <5  /  AlkPhos  54  04-16          Troponin T, Serum: <0.01 ng/mL (04-15-20 @ 14:20)      CARDIAC MARKERS ( 15 Apr 2020 14:20 )  x     / <0.01 ng/mL / x     / x     / x            PHYSICAL EXAM:    GENERAL: NAD, well-developed, AAOx2  HEENT:  Atraumatic, Normocephalic.   PULMONARY: decreased bibasilar BS No wheeze  CARDIOVASCULAR: Regular rate and rhythm; No murmurs, rubs, or gallops  GASTROINTESTINAL: Soft, Nontender, Nondistended; Bowel sounds present  MUSCULOSKELETAL:  2+ Peripheral Pulses, No edema  NEUROLOGY: non-focal, alert, tangental thinking, grandiose

## 2020-04-16 NOTE — PHYSICAL THERAPY INITIAL EVALUATION ADULT - GENERAL OBSERVATIONS, REHAB EVAL
Pt IE completed 10:20-10:50. Pt agreeable to IE. Pt in bed with HOB slightly elevated on room room spO2=92%. Pt in NAD.

## 2020-04-17 LAB
ANION GAP SERPL CALC-SCNC: 11 MMOL/L — SIGNIFICANT CHANGE UP (ref 7–14)
BASOPHILS # BLD AUTO: 0.05 K/UL — SIGNIFICANT CHANGE UP (ref 0–0.2)
BASOPHILS NFR BLD AUTO: 0.4 % — SIGNIFICANT CHANGE UP (ref 0–1)
BUN SERPL-MCNC: 8 MG/DL — LOW (ref 10–20)
CALCIUM SERPL-MCNC: 9.3 MG/DL — SIGNIFICANT CHANGE UP (ref 8.5–10.1)
CHLORIDE SERPL-SCNC: 108 MMOL/L — SIGNIFICANT CHANGE UP (ref 98–110)
CO2 SERPL-SCNC: 23 MMOL/L — SIGNIFICANT CHANGE UP (ref 17–32)
CREAT SERPL-MCNC: 0.8 MG/DL — SIGNIFICANT CHANGE UP (ref 0.7–1.5)
EOSINOPHIL # BLD AUTO: 0.22 K/UL — SIGNIFICANT CHANGE UP (ref 0–0.7)
EOSINOPHIL NFR BLD AUTO: 1.9 % — SIGNIFICANT CHANGE UP (ref 0–8)
GLUCOSE SERPL-MCNC: 95 MG/DL — SIGNIFICANT CHANGE UP (ref 70–99)
HCT VFR BLD CALC: 34.2 % — LOW (ref 37–47)
HGB BLD-MCNC: 11.1 G/DL — LOW (ref 12–16)
IMM GRANULOCYTES NFR BLD AUTO: 1.4 % — HIGH (ref 0.1–0.3)
LYMPHOCYTES # BLD AUTO: 17.3 % — LOW (ref 20.5–51.1)
LYMPHOCYTES # BLD AUTO: 2.05 K/UL — SIGNIFICANT CHANGE UP (ref 1.2–3.4)
MAGNESIUM SERPL-MCNC: 2.2 MG/DL — SIGNIFICANT CHANGE UP (ref 1.8–2.4)
MCHC RBC-ENTMCNC: 31.5 PG — HIGH (ref 27–31)
MCHC RBC-ENTMCNC: 32.5 G/DL — SIGNIFICANT CHANGE UP (ref 32–37)
MCV RBC AUTO: 97.2 FL — SIGNIFICANT CHANGE UP (ref 81–99)
MONOCYTES # BLD AUTO: 0.86 K/UL — HIGH (ref 0.1–0.6)
MONOCYTES NFR BLD AUTO: 7.3 % — SIGNIFICANT CHANGE UP (ref 1.7–9.3)
NEUTROPHILS # BLD AUTO: 8.48 K/UL — HIGH (ref 1.4–6.5)
NEUTROPHILS NFR BLD AUTO: 71.7 % — SIGNIFICANT CHANGE UP (ref 42.2–75.2)
NRBC # BLD: 0 /100 WBCS — SIGNIFICANT CHANGE UP (ref 0–0)
PLATELET # BLD AUTO: 485 K/UL — HIGH (ref 130–400)
POTASSIUM SERPL-MCNC: 4.7 MMOL/L — SIGNIFICANT CHANGE UP (ref 3.5–5)
POTASSIUM SERPL-SCNC: 4.7 MMOL/L — SIGNIFICANT CHANGE UP (ref 3.5–5)
RBC # BLD: 3.52 M/UL — LOW (ref 4.2–5.4)
RBC # FLD: 12.5 % — SIGNIFICANT CHANGE UP (ref 11.5–14.5)
SODIUM SERPL-SCNC: 142 MMOL/L — SIGNIFICANT CHANGE UP (ref 135–146)
WBC # BLD: 11.82 K/UL — HIGH (ref 4.8–10.8)
WBC # FLD AUTO: 11.82 K/UL — HIGH (ref 4.8–10.8)

## 2020-04-17 PROCEDURE — 99233 SBSQ HOSP IP/OBS HIGH 50: CPT

## 2020-04-17 RX ADMIN — LAMOTRIGINE 25 MILLIGRAM(S): 25 TABLET, ORALLY DISINTEGRATING ORAL at 12:05

## 2020-04-17 RX ADMIN — Medication 400 MILLIGRAM(S): at 13:00

## 2020-04-17 RX ADMIN — ARIPIPRAZOLE 30 MILLIGRAM(S): 15 TABLET ORAL at 12:05

## 2020-04-17 RX ADMIN — AMLODIPINE BESYLATE 10 MILLIGRAM(S): 2.5 TABLET ORAL at 05:44

## 2020-04-17 RX ADMIN — PANTOPRAZOLE SODIUM 40 MILLIGRAM(S): 20 TABLET, DELAYED RELEASE ORAL at 06:16

## 2020-04-17 RX ADMIN — CHLORHEXIDINE GLUCONATE 1 APPLICATION(S): 213 SOLUTION TOPICAL at 05:44

## 2020-04-17 RX ADMIN — LITHIUM CARBONATE 150 MILLIGRAM(S): 300 TABLET, EXTENDED RELEASE ORAL at 12:05

## 2020-04-17 RX ADMIN — ENOXAPARIN SODIUM 40 MILLIGRAM(S): 100 INJECTION SUBCUTANEOUS at 12:05

## 2020-04-17 RX ADMIN — BUDESONIDE AND FORMOTEROL FUMARATE DIHYDRATE 2 PUFF(S): 160; 4.5 AEROSOL RESPIRATORY (INHALATION) at 07:57

## 2020-04-17 RX ADMIN — Medication 50 MICROGRAM(S): at 05:44

## 2020-04-17 RX ADMIN — BUDESONIDE AND FORMOTEROL FUMARATE DIHYDRATE 2 PUFF(S): 160; 4.5 AEROSOL RESPIRATORY (INHALATION) at 21:40

## 2020-04-17 NOTE — PROGRESS NOTE ADULT - SUBJECTIVE AND OBJECTIVE BOX
JB OLIVEIRA 79y Female  MRN#: [id number]   CODE STATUS:________      SUBJECTIVE  Patient is a 79y old Female who presents with a chief complaint of Shortness of breath (17 Apr 2020 11:17)  Currently admitted to medicine with the primary diagnosis of Shortness of breath    Today is hospital day 2d, and this morning she is resting in bed, no acute events overnight. Currently comfortable on room air, will reassess ambulatory Pulse Ox. Patient denies any acute sob, cp, abdominal symptoms D/c planning          OBJECTIVE  PAST MEDICAL & SURGICAL HISTORY  Anemia  Insomnia  Hypercholesterolemia  Dementia  Hypothyroid  GERD (gastroesophageal reflux disease)  COPD (chronic obstructive pulmonary disease)  Schizophrenia  Bipolar 1 disorder    ALLERGIES:  Milk (Unknown)  Risperdal (Unknown)  risperidone (Unknown)    MEDICATIONS:  STANDING MEDICATIONS  amLODIPine   Tablet 10 milliGRAM(s) Oral daily  ARIPiprazole 30 milliGRAM(s) Oral daily  budesonide 160 MICROgram(s)/formoterol 4.5 MICROgram(s) Inhaler 2 Puff(s) Inhalation two times a day  chlorhexidine 4% Liquid 1 Application(s) Topical <User Schedule>  enoxaparin Injectable 40 milliGRAM(s) SubCutaneous daily  hydroxychloroquine 400 milliGRAM(s) Oral every 24 hours  lamoTRIgine 25 milliGRAM(s) Oral daily  levothyroxine 50 MICROGram(s) Oral daily  lithium 150 milliGRAM(s) Oral daily  pantoprazole    Tablet 40 milliGRAM(s) Oral before breakfast    PRN MEDICATIONS      VITAL SIGNS: Last 24 Hours  T(C): 36.2 (17 Apr 2020 05:00), Max: 36.2 (17 Apr 2020 05:00)  T(F): 97.1 (17 Apr 2020 05:00), Max: 97.1 (17 Apr 2020 05:00)  HR: 70 (17 Apr 2020 05:00) (70 - 87)  BP: 120/56 (17 Apr 2020 05:00) (112/56 - 120/56)  BP(mean): --  RR: 18 (17 Apr 2020 05:00) (18 - 18)  SpO2: 94% (17 Apr 2020 13:03) (92% - 98%)    LABS:                        11.1   11.82 )-----------( 485      ( 17 Apr 2020 11:17 )             34.2     04-17    142  |  108  |  8<L>  ----------------------------<  95  4.7   |  23  |  0.8    Ca    9.3      17 Apr 2020 11:17  Mg     2.2     04-17    TPro  6.1  /  Alb  3.5  /  TBili  <0.2  /  DBili  x   /  AST  12  /  ALT  <5  /  AlkPhos  54  04-16              CARDIAC MARKERS ( 15 Apr 2020 14:20 )  x     / <0.01 ng/mL / x     / x     / x                PHYSICAL EXAM:    GENERAL: NAD, well-developed, AAOx3  HEENT:  Atraumatic, Normocephalic.   PULMONARY: decreased bibasilar BS No wheeze  CARDIOVASCULAR: Regular rate and rhythm; No murmurs, rubs, or gallops  GASTROINTESTINAL: Soft, Nontender, Nondistended; Bowel sounds present  MUSCULOSKELETAL:  2+ Peripheral Pulses, No edema  NEUROLOGY: non-focal, alert and oriented

## 2020-04-17 NOTE — PROGRESS NOTE ADULT - ASSESSMENT
78 [facility] PM[facility] multiple problems including  COPD not on home o2, IBD, GERD, dementia, bipolar, schizophrenia, hypothyroidism, HLD, constipation, insomnia, anemia BIBEMS for evaluation of SOB and " gasping for air" for several days.    SOB secondary to COVID-19 PNA  - patient currently saturating SPO2 96% room air, symptom day 7, afebrile  - CXR: with left basilar opacity  - elevated wbc, thrombocytosis, no lymphopenia  - Procalcitonin 0.05, CRP 0.17, ferritin 445  - , Plaquenil 2/5  - Ceftriaxone d/c  - tylenol PRN for fevers  - c/w tessalon  - f/u urinalysis    PMH of COPD, stable  - not on home o2  - c/w SYMBICORT    IBD, GERD  - c/w ppx    Bipolar, Schizophrenia  - c/w Abilify   - c/w lamotrigine  - c/w lithium    Hypothyroidism  - c/w syntheroid     HLD,   - c/w statin    Constipation, improved  - c/w bowel regimen  - Insomnia, anemia  - c/w home meds    DVTppx: Lovenox  GI ppx: PPI  Full code  Dispo: from assisted living, tentative d/c once accepted

## 2020-04-17 NOTE — PROGRESS NOTE ADULT - ASSESSMENT
A 80 yo female from [facility] PMH multiple problems including  COPD not on home o2, IBD, GERD, dementia, bipolar, schizophrenia, hypothyroidism, HLD, constipation, insomnia, anemia BIBEMS for evaluation of SOB and " gasping for air" for several days.    A/P:   Pneumonia: to rule out COVI-19  No hypoxia.   CXR showed left basilar opacity.   Continue Plaquenil 400mg daily for another 4 days. Discontinue Ceftriaxone.     COPD: stable, no wheezing  Continue Symbicort. Albuterol MDI as needed.     Bipolar, Schizophrenia  Continue Abilify, Lamotrigine and Lithium.     Hypothyroidism  Continue Synthroid.    DVTppx: Lovenox  GI ppx: PPI  Full code    #Progress Note Handoff:  Pending (specify): ambulatory oximetry.   Family discussion:  Disposition: psych facility, possible today if ambulatory oximetry is >90%.

## 2020-04-17 NOTE — PROGRESS NOTE ADULT - SUBJECTIVE AND OBJECTIVE BOX
JB OLIVEIRA  79y  Female      Patient is a 79y old  Female who presents with a chief complaint of Shortness of breath (16 Apr 2020 15:00)      INTERVAL HPI/OVERNIGHT EVENTS:  She feels ok, no chest pain or SOB, no fever.   Vital Signs Last 24 Hrs  T(C): 36.2 (17 Apr 2020 05:00), Max: 36.7 (16 Apr 2020 13:11)  T(F): 97.1 (17 Apr 2020 05:00), Max: 98.1 (16 Apr 2020 13:11)  HR: 70 (17 Apr 2020 05:00) (70 - 91)  BP: 120/56 (17 Apr 2020 05:00) (108/71 - 120/56)  BP(mean): --  RR: 18 (17 Apr 2020 05:00) (18 - 18)  SpO2: 95% (17 Apr 2020 07:49) (92% - 98%)      04-16-20 @ 07:01  -  04-17-20 @ 07:00  --------------------------------------------------------  IN: 210 mL / OUT: 0 mL / NET: 210 mL            Consultant(s) Notes Reviewed:  [x ] YES  [ ] NO          MEDICATIONS  (STANDING):  amLODIPine   Tablet 10 milliGRAM(s) Oral daily  ARIPiprazole 30 milliGRAM(s) Oral daily  budesonide 160 MICROgram(s)/formoterol 4.5 MICROgram(s) Inhaler 2 Puff(s) Inhalation two times a day  chlorhexidine 4% Liquid 1 Application(s) Topical <User Schedule>  enoxaparin Injectable 40 milliGRAM(s) SubCutaneous daily  hydroxychloroquine 400 milliGRAM(s) Oral every 24 hours  lamoTRIgine 25 milliGRAM(s) Oral daily  levothyroxine 50 MICROGram(s) Oral daily  lithium 150 milliGRAM(s) Oral daily  pantoprazole    Tablet 40 milliGRAM(s) Oral before breakfast    MEDICATIONS  (PRN):      LABS                          10.9   12.53 )-----------( 457      ( 16 Apr 2020 05:40 )             34.1     04-16    141  |  105  |  11  ----------------------------<  93  4.0   |  25  |  0.8    Ca    9.2      16 Apr 2020 05:40    TPro  6.1  /  Alb  3.5  /  TBili  <0.2  /  DBili  x   /  AST  12  /  ALT  <5  /  AlkPhos  54  04-16          Lactate Trend    CARDIAC MARKERS ( 15 Apr 2020 14:20 )  x     / <0.01 ng/mL / x     / x     / x          CAPILLARY BLOOD GLUCOSE            RADIOLOGY & ADDITIONAL TESTS:    Imaging Personally Reviewed:  [ ] YES  [ ] NO    HEALTH ISSUES - PROBLEM Dx:        PHYSICAL EXAM:  GENERAL: NAD, well-developed  HEAD:  Atraumatic, Normocephalic  EYES: EOMI, PERRLA, conjunctiva and sclera clear  NECK: Supple, No JVD  CHEST/LUNG: left lower lung rales.   HEART: Regular rate and rhythm; S1 S2  ABDOMEN: Soft, Nontender, Nondistended; Bowel sounds present  EXTREMITIES:  2+ Peripheral Pulses, No clubbing, cyanosis, or edema  PSYCH: AAOx3  NEUROLOGY: non-focal  SKIN: No rashes or lesions

## 2020-04-18 LAB
ANION GAP SERPL CALC-SCNC: 12 MMOL/L — SIGNIFICANT CHANGE UP (ref 7–14)
APPEARANCE UR: CLEAR — SIGNIFICANT CHANGE UP
BASOPHILS # BLD AUTO: 0.04 K/UL — SIGNIFICANT CHANGE UP (ref 0–0.2)
BASOPHILS NFR BLD AUTO: 0.4 % — SIGNIFICANT CHANGE UP (ref 0–1)
BILIRUB UR-MCNC: NEGATIVE — SIGNIFICANT CHANGE UP
BUN SERPL-MCNC: 11 MG/DL — SIGNIFICANT CHANGE UP (ref 10–20)
CALCIUM SERPL-MCNC: 9.2 MG/DL — SIGNIFICANT CHANGE UP (ref 8.5–10.1)
CHLORIDE SERPL-SCNC: 106 MMOL/L — SIGNIFICANT CHANGE UP (ref 98–110)
CO2 SERPL-SCNC: 25 MMOL/L — SIGNIFICANT CHANGE UP (ref 17–32)
COLOR SPEC: SIGNIFICANT CHANGE UP
CREAT SERPL-MCNC: 0.9 MG/DL — SIGNIFICANT CHANGE UP (ref 0.7–1.5)
DIFF PNL FLD: NEGATIVE — SIGNIFICANT CHANGE UP
EOSINOPHIL # BLD AUTO: 0.24 K/UL — SIGNIFICANT CHANGE UP (ref 0–0.7)
EOSINOPHIL NFR BLD AUTO: 2.2 % — SIGNIFICANT CHANGE UP (ref 0–8)
GLUCOSE SERPL-MCNC: 92 MG/DL — SIGNIFICANT CHANGE UP (ref 70–99)
GLUCOSE UR QL: NEGATIVE — SIGNIFICANT CHANGE UP
HCT VFR BLD CALC: 33.8 % — LOW (ref 37–47)
HGB BLD-MCNC: 10.8 G/DL — LOW (ref 12–16)
IMM GRANULOCYTES NFR BLD AUTO: 1.6 % — HIGH (ref 0.1–0.3)
KETONES UR-MCNC: NEGATIVE — SIGNIFICANT CHANGE UP
LEUKOCYTE ESTERASE UR-ACNC: NEGATIVE — SIGNIFICANT CHANGE UP
LYMPHOCYTES # BLD AUTO: 2.28 K/UL — SIGNIFICANT CHANGE UP (ref 1.2–3.4)
LYMPHOCYTES # BLD AUTO: 20.8 % — SIGNIFICANT CHANGE UP (ref 20.5–51.1)
MAGNESIUM SERPL-MCNC: 2.1 MG/DL — SIGNIFICANT CHANGE UP (ref 1.8–2.4)
MCHC RBC-ENTMCNC: 30.9 PG — SIGNIFICANT CHANGE UP (ref 27–31)
MCHC RBC-ENTMCNC: 32 G/DL — SIGNIFICANT CHANGE UP (ref 32–37)
MCV RBC AUTO: 96.6 FL — SIGNIFICANT CHANGE UP (ref 81–99)
MONOCYTES # BLD AUTO: 0.86 K/UL — HIGH (ref 0.1–0.6)
MONOCYTES NFR BLD AUTO: 7.9 % — SIGNIFICANT CHANGE UP (ref 1.7–9.3)
NEUTROPHILS # BLD AUTO: 7.35 K/UL — HIGH (ref 1.4–6.5)
NEUTROPHILS NFR BLD AUTO: 67.1 % — SIGNIFICANT CHANGE UP (ref 42.2–75.2)
NITRITE UR-MCNC: NEGATIVE — SIGNIFICANT CHANGE UP
NRBC # BLD: 0 /100 WBCS — SIGNIFICANT CHANGE UP (ref 0–0)
PH UR: 7 — SIGNIFICANT CHANGE UP (ref 5–8)
PLATELET # BLD AUTO: 508 K/UL — HIGH (ref 130–400)
POTASSIUM SERPL-MCNC: 4.5 MMOL/L — SIGNIFICANT CHANGE UP (ref 3.5–5)
POTASSIUM SERPL-SCNC: 4.5 MMOL/L — SIGNIFICANT CHANGE UP (ref 3.5–5)
PROT UR-MCNC: SIGNIFICANT CHANGE UP
RBC # BLD: 3.5 M/UL — LOW (ref 4.2–5.4)
RBC # FLD: 12.6 % — SIGNIFICANT CHANGE UP (ref 11.5–14.5)
SODIUM SERPL-SCNC: 143 MMOL/L — SIGNIFICANT CHANGE UP (ref 135–146)
SP GR SPEC: 1.01 — SIGNIFICANT CHANGE UP (ref 1.01–1.02)
UROBILINOGEN FLD QL: SIGNIFICANT CHANGE UP
WBC # BLD: 10.95 K/UL — HIGH (ref 4.8–10.8)
WBC # FLD AUTO: 10.95 K/UL — HIGH (ref 4.8–10.8)

## 2020-04-18 PROCEDURE — 99232 SBSQ HOSP IP/OBS MODERATE 35: CPT

## 2020-04-18 RX ADMIN — PANTOPRAZOLE SODIUM 40 MILLIGRAM(S): 20 TABLET, DELAYED RELEASE ORAL at 05:43

## 2020-04-18 RX ADMIN — ENOXAPARIN SODIUM 40 MILLIGRAM(S): 100 INJECTION SUBCUTANEOUS at 12:03

## 2020-04-18 RX ADMIN — BUDESONIDE AND FORMOTEROL FUMARATE DIHYDRATE 2 PUFF(S): 160; 4.5 AEROSOL RESPIRATORY (INHALATION) at 08:42

## 2020-04-18 RX ADMIN — Medication 50 MICROGRAM(S): at 05:43

## 2020-04-18 RX ADMIN — CHLORHEXIDINE GLUCONATE 1 APPLICATION(S): 213 SOLUTION TOPICAL at 05:43

## 2020-04-18 RX ADMIN — AMLODIPINE BESYLATE 10 MILLIGRAM(S): 2.5 TABLET ORAL at 05:43

## 2020-04-18 RX ADMIN — LITHIUM CARBONATE 150 MILLIGRAM(S): 300 TABLET, EXTENDED RELEASE ORAL at 12:03

## 2020-04-18 RX ADMIN — ARIPIPRAZOLE 30 MILLIGRAM(S): 15 TABLET ORAL at 12:03

## 2020-04-18 RX ADMIN — Medication 400 MILLIGRAM(S): at 14:52

## 2020-04-18 RX ADMIN — LAMOTRIGINE 25 MILLIGRAM(S): 25 TABLET, ORALLY DISINTEGRATING ORAL at 12:03

## 2020-04-18 NOTE — CHART NOTE - NSCHARTNOTEFT_GEN_A_CORE
80 y/o female from [facility] living [facility].   She has multiple past medical problems including: COPD (no home O2), IBD, GERD,   dementia, bipolar, schizophrenia, hypothyroidism, HLD, constipation, insomnia, anemia.  She was BIBEMS for evaluation of SOB and " gasping for air" x several days.  COVID (+) pneumonia.  D-Dimer = 485ng/mL.  Hydroxychloroquine initiated on 04/16/2020 and due to complete on 04/20/2020.    O2 sat was 93% on 2L NC.  She is comfortable and in no acute distress.

## 2020-04-18 NOTE — PROGRESS NOTE ADULT - SUBJECTIVE AND OBJECTIVE BOX
JB OLIVEIRA  79y  Female      Patient is a 79y old  Female who presents with a chief complaint of Shortness of breath (2020 13:29)      INTERVAL HPI/OVERNIGHT EVENTS:  She feels ok, she has no chest pain or SOB.   Vital Signs Last 24 Hrs  T(C): 36.1 (2020 05:30), Max: 36.1 (2020 05:30)  T(F): 96.9 (2020 05:30), Max: 96.9 (2020 05:30)  HR: 71 (2020 05:30) (71 - 75)  BP: 139/65 (2020 05:30) (114/65 - 139/65)  BP(mean): --  RR: 18 (2020 05:30) (18 - 18)  SpO2: 97% (2020 05:30) (95% - 97%)            Consultant(s) Notes Reviewed:  [x ] YES  [ ] NO          MEDICATIONS  (STANDING):  amLODIPine   Tablet 10 milliGRAM(s) Oral daily  ARIPiprazole 30 milliGRAM(s) Oral daily  budesonide 160 MICROgram(s)/formoterol 4.5 MICROgram(s) Inhaler 2 Puff(s) Inhalation two times a day  chlorhexidine 4% Liquid 1 Application(s) Topical <User Schedule>  enoxaparin Injectable 40 milliGRAM(s) SubCutaneous daily  hydroxychloroquine 400 milliGRAM(s) Oral every 24 hours  lamoTRIgine 25 milliGRAM(s) Oral daily  levothyroxine 50 MICROGram(s) Oral daily  lithium 150 milliGRAM(s) Oral daily  pantoprazole    Tablet 40 milliGRAM(s) Oral before breakfast    MEDICATIONS  (PRN):      LABS                          10.8   10.95 )-----------( 508      ( 2020 07:08 )             33.8     04-18    143  |  106  |  11  ----------------------------<  92  4.5   |  25  |  0.9    Ca    9.2      2020 07:08  Mg     2.1     04-18        Urinalysis Basic - ( 2020 09:20 )    Color: Light Yellow / Appearance: Clear / S.011 / pH: x  Gluc: x / Ketone: Negative  / Bili: Negative / Urobili: <2 mg/dL   Blood: x / Protein: Trace / Nitrite: Negative   Leuk Esterase: Negative / RBC: x / WBC x   Sq Epi: x / Non Sq Epi: x / Bacteria: x        Lactate Trend        CAPILLARY BLOOD GLUCOSE            RADIOLOGY & ADDITIONAL TESTS:    Imaging Personally Reviewed:  [ ] YES  [ ] NO    HEALTH ISSUES - PROBLEM Dx:        PHYSICAL EXAM:  GENERAL: NAD, well-developed  HEAD:  Atraumatic, Normocephalic  EYES: EOMI, PERRLA, conjunctiva and sclera clear  NECK: Supple, No JVD  CHEST/LUNG: left lower lung rales.   HEART: Regular rate and rhythm; S1 S2  ABDOMEN: Soft, Nontender, Nondistended; Bowel sounds present  EXTREMITIES:  2+ Peripheral Pulses, No clubbing, cyanosis, or edema  PSYCH: AAOx3  NEUROLOGY: non-focal  SKIN: No rashes or lesions

## 2020-04-18 NOTE — CHART NOTE - NSCHARTNOTEFT_GEN_A_CORE
MICU Transfer Note    Transfer from: 3A  Transfer to: [facility]   Accepting physican:      HOSPITAL COURSE:  78 [facility] including  COPD not on home o2, IBD, GERD, dementia, bipolar, schizophrenia, hypothyroidism, HLD, constipation, insomnia, anemia BIBEMS for evaluation of SOB and " gasping for air" for several days.  Patient reports symptoms at rest and on ambulation.  Denies fever, chills cough, CP, back or abdominal pain, no diarrhea or urinary complaints, no leg pain or swelling.        ASSESSMENT & PLAN:         For Follow-Up:          Vital Signs Last 24 Hrs  T(C): 36.1 (18 Apr 2020 05:30), Max: 37.1 (17 Apr 2020 13:00)  T(F): 96.9 (18 Apr 2020 05:30), Max: 98.8 (17 Apr 2020 13:00)  HR: 71 (18 Apr 2020 05:30) (19 - 75)  BP: 139/65 (18 Apr 2020 05:30) (114/65 - 142/64)  BP(mean): --  RR: 18 (18 Apr 2020 05:30) (18 - 18)  SpO2: 97% (18 Apr 2020 05:30) (94% - 97%)  I&O's Summary        MEDICATIONS  (STANDING):  amLODIPine   Tablet 10 milliGRAM(s) Oral daily  ARIPiprazole 30 milliGRAM(s) Oral daily  budesonide 160 MICROgram(s)/formoterol 4.5 MICROgram(s) Inhaler 2 Puff(s) Inhalation two times a day  chlorhexidine 4% Liquid 1 Application(s) Topical <User Schedule>  enoxaparin Injectable 40 milliGRAM(s) SubCutaneous daily  hydroxychloroquine 400 milliGRAM(s) Oral every 24 hours  lamoTRIgine 25 milliGRAM(s) Oral daily  levothyroxine 50 MICROGram(s) Oral daily  lithium 150 milliGRAM(s) Oral daily  pantoprazole    Tablet 40 milliGRAM(s) Oral before breakfast    MEDICATIONS  (PRN):        LABS                                            10.8                  Neurophils% (auto):   67.1   (04-18 @ 07:08):    10.95)-----------(508          Lymphocytes% (auto):  20.8                                          33.8                   Eosinphils% (auto):   2.2      Manual%: Neutrophils x    ; Lymphocytes x    ; Eosinophils x    ; Bands%: x    ; Blasts x                                    143    |  106    |  11                  Calcium: 9.2   / iCa: x      (04-18 @ 07:08)    ----------------------------<  92        Magnesium: 2.1                              4.5     |  25     |  0.9              Phosphorous: x MICU Transfer Note    Transfer from: 3A  Transfer to: [facility]   Accepting physican:      HOSPITAL COURSE:  78 yo female from [facility] multiple problems including  COPD not on home o2, IBD, GERD, dementia, bipolar, schizophrenia, hypothyroidism, HLD, constipation, insomnia, anemia BIBEMS for evaluation of SOB and " gasping for air" for several days.  Patient reports symptoms at rest and on ambulation.  Denies fever, chills cough, CP, back or abdominal pain, no diarrhea or urinary complaints, no leg pain or swelling. Patient tested positive for COVID PNA, requiring 2L NC on hospital day 1, however tolerated room air on following day. Patient was treated with ceftriaxone for suspected superimposed infection. She was started on Plaquenil, requires 1-2L O2 on ambulation with SPO2 92%, afebrile over 48 hours, stable for d/c to [facility] followed by going back to assisted living (able to take patient back on Monday),         ASSESSMENT & PLAN:     78 yo female from [facility] PMH multiple problems including  COPD not on home o2, IBD, GERD, dementia, bipolar, schizophrenia, hypothyroidism, HLD, constipation, insomnia, anemia BIBEMS for evaluation of SOB and " gasping for air" for several days.    SOB secondary to COVID-19 PNA  - patient currently saturating SPO2 95% room air, SPO2 86-87 ambulation on room air, 92% on 2L NC, symptom day 8 (improved sob),  afebrile  - titrate O2 as tolerated  - CXR: with left basilar opacity  - Procalcitonin 0.05, CRP 0.17, ferritin 445  - , Plaquenil 3/5  - s/p Ceftraixone  - tylenol PRN for fevers  - c/w tessalon    PMH of COPD, stable  - not on home o2  - c/w SYMBICORT    IBD, GERD  - c/w ppx    Bipolar, Schizophrenia  - c/w Abilify   - c/w lamotrigine  - c/w lithium    Hypothyroidism  - c/w synthroid     HLD,   - c/w statin    Constipation, improved  - c/w bowel regimen  - Insomnia, anemia  - c/w home meds    DVTppx: Lovenox  GI ppx: PPI  Full code  Dispo: from assisted living, can take patient back on Monday if not on O2, form for assited living with patient's chart          For Follow-Up:  - cbc, bmp  - f/u form for assisted living  - titrate O2 as tolerated        Vital Signs Last 24 Hrs  T(C): 36.1 (18 Apr 2020 05:30), Max: 37.1 (17 Apr 2020 13:00)  T(F): 96.9 (18 Apr 2020 05:30), Max: 98.8 (17 Apr 2020 13:00)  HR: 71 (18 Apr 2020 05:30) (19 - 75)  BP: 139/65 (18 Apr 2020 05:30) (114/65 - 142/64)  BP(mean): --  RR: 18 (18 Apr 2020 05:30) (18 - 18)  SpO2: 97% (18 Apr 2020 05:30) (94% - 97%)  I&O's Summary        MEDICATIONS  (STANDING):  amLODIPine   Tablet 10 milliGRAM(s) Oral daily  ARIPiprazole 30 milliGRAM(s) Oral daily  budesonide 160 MICROgram(s)/formoterol 4.5 MICROgram(s) Inhaler 2 Puff(s) Inhalation two times a day  chlorhexidine 4% Liquid 1 Application(s) Topical <User Schedule>  enoxaparin Injectable 40 milliGRAM(s) SubCutaneous daily  hydroxychloroquine 400 milliGRAM(s) Oral every 24 hours  lamoTRIgine 25 milliGRAM(s) Oral daily  levothyroxine 50 MICROGram(s) Oral daily  lithium 150 milliGRAM(s) Oral daily  pantoprazole    Tablet 40 milliGRAM(s) Oral before breakfast    MEDICATIONS  (PRN):        LABS                                            10.8                  Neurophils% (auto):   67.1   (04-18 @ 07:08):    10.95)-----------(508          Lymphocytes% (auto):  20.8                                          33.8                   Eosinphils% (auto):   2.2      Manual%: Neutrophils x    ; Lymphocytes x    ; Eosinophils x    ; Bands%: x    ; Blasts x                                    143    |  106    |  11                  Calcium: 9.2   / iCa: x      (04-18 @ 07:08)    ----------------------------<  92        Magnesium: 2.1                              4.5     |  25     |  0.9              Phosphorous: x

## 2020-04-18 NOTE — PROGRESS NOTE ADULT - ASSESSMENT
A 80 yo female from [facility] assisted living facility PMH multiple problems including  COPD not on home o2, IBD, GERD, dementia, bipolar, schizophrenia, hypothyroidism, HLD, constipation, insomnia, anemia BIBEMS for evaluation of SOB and " gasping for air" for several days.    A/P:   Pneumonia: to rule out COVI-19  No hypoxia.   CXR showed left basilar opacity.   Continue Plaquenil 400mg daily until 4/20. Discontinue Ceftriaxone.     COPD: stable, no wheezing  Continue Symbicort. Albuterol MDI as needed.     Bipolar, Schizophrenia  Stable.   Continue Abilify, Lamotrigine and Lithium.     Hypothyroidism  Continue Synthroid.    DVTppx: Lovenox  GI ppx: PPI  Full code    #Progress Note Handoff:  Pending (specify): ambulatory oximetry.   Family discussion:  Disposition: psych facility, transfer to [facility] today, likely discharge back to [facility] on Monday.

## 2020-04-19 LAB — LEGIONELLA AG UR QL: NEGATIVE — SIGNIFICANT CHANGE UP

## 2020-04-19 RX ORDER — ONDANSETRON 8 MG/1
4 TABLET, FILM COATED ORAL
Refills: 0 | Status: DISCONTINUED | OUTPATIENT
Start: 2020-04-19 | End: 2020-04-21

## 2020-04-19 RX ADMIN — ONDANSETRON 4 MILLIGRAM(S): 8 TABLET, FILM COATED ORAL at 22:46

## 2020-04-19 RX ADMIN — LITHIUM CARBONATE 150 MILLIGRAM(S): 300 TABLET, EXTENDED RELEASE ORAL at 11:50

## 2020-04-19 RX ADMIN — LAMOTRIGINE 25 MILLIGRAM(S): 25 TABLET, ORALLY DISINTEGRATING ORAL at 11:50

## 2020-04-19 RX ADMIN — BUDESONIDE AND FORMOTEROL FUMARATE DIHYDRATE 2 PUFF(S): 160; 4.5 AEROSOL RESPIRATORY (INHALATION) at 22:47

## 2020-04-19 RX ADMIN — ENOXAPARIN SODIUM 40 MILLIGRAM(S): 100 INJECTION SUBCUTANEOUS at 11:51

## 2020-04-19 RX ADMIN — PANTOPRAZOLE SODIUM 40 MILLIGRAM(S): 20 TABLET, DELAYED RELEASE ORAL at 08:17

## 2020-04-19 RX ADMIN — BUDESONIDE AND FORMOTEROL FUMARATE DIHYDRATE 2 PUFF(S): 160; 4.5 AEROSOL RESPIRATORY (INHALATION) at 08:18

## 2020-04-19 RX ADMIN — Medication 50 MICROGRAM(S): at 08:17

## 2020-04-19 RX ADMIN — ARIPIPRAZOLE 30 MILLIGRAM(S): 15 TABLET ORAL at 11:51

## 2020-04-19 RX ADMIN — Medication 400 MILLIGRAM(S): at 17:24

## 2020-04-19 NOTE — PROGRESS NOTE ADULT - SUBJECTIVE AND OBJECTIVE BOX
Medicine Progress Note  78 y/o female from [facility].   She has multiple past medical problems including: COPD (no home O2), IBD, GERD,   dementia, bipolar, schizophrenia, hypothyroidism, HLD, constipation, insomnia, anemia.  She was BIBEMS for evaluation of SOB and " gasping for air" x several days.  COVID (+) pneumonia.  D-Dimer = 485ng/mL.  Hydroxychloroquine initiated on 2020 and due to complete on 2020    SUBJECTIVE / OVERNIGHT EVENTS:  Her O2 sat was 92% on 2L NC.  Pt denies any worsening cough and SOB, chest pain, n/v and/or abdominal pain.      MEDICATIONS  (STANDING):  amLODIPine   Tablet 10 milliGRAM(s) Oral daily  ARIPiprazole 30 milliGRAM(s) Oral daily  budesonide 160 MICROgram(s)/formoterol 4.5 MICROgram(s) Inhaler 2 Puff(s) Inhalation two times a day  chlorhexidine 4% Liquid 1 Application(s) Topical <User Schedule>  enoxaparin Injectable 40 milliGRAM(s) SubCutaneous daily  hydroxychloroquine 400 milliGRAM(s) Oral every 24 hours  lamoTRIgine 25 milliGRAM(s) Oral daily  levothyroxine 50 MICROGram(s) Oral daily  lithium 150 milliGRAM(s) Oral daily  pantoprazole    Tablet 40 milliGRAM(s) Oral before breakfast    MEDICATIONS  (PRN):    CAPILLARY BLOOD GLUCOSE        I&O's Summary      PHYSICAL EXAM:  Vital Signs Last 24 Hrs  T(C): 36.4 (2020 04:48), Max: 37.1 (2020 16:17)  T(F): 97.5 (2020 04:48), Max: 98.7 (2020 16:17)  HR: 67 (2020 04:48) (67 - 77)  BP: 131/72 (2020 04:48) (109/57 - 131/72)  BP(mean): --  RR: 24 (2020 04:48) (16 - 28)  SpO2: 90% (2020 04:48) (88% - 97%)  GEN: Pt in NAD.  EYES: Sclera white w/o injection, PERRLA.   ENT: Head NCAT. Nose w/o deformity. No auricular TTP. MMM. Neck supple FROM.   RESP: No chest wall tenderness, CTA b/l, no wheezes, rales, or rhonchi. Decreased breath sounds.  CARDIAC: RRR, clear distinct S1, S2, no appreciable murmurs.  ABD: Abdomen soft, non-tender. No CVAT b/l.  VASC: Radial and dorsalis pedis pulses 2+ b/l. No edema or calf tenderness.  SKIN: No rashes or lesions.      LABS:                        10.8   10.95 )-----------( 508      ( 2020 07:08 )             33.8     04-18    143  |  106  |  11  ----------------------------<  92  4.5   |  25  |  0.9    Ca    9.2      2020 07:08  Mg     2.1     04-18            Urinalysis Basic - ( 2020 09:20 )    Color: Light Yellow / Appearance: Clear / S.011 / pH: x  Gluc: x / Ketone: Negative  / Bili: Negative / Urobili: <2 mg/dL   Blood: x / Protein: Trace / Nitrite: Negative   Leuk Esterase: Negative / RBC: x / WBC x   Sq Epi: x / Non Sq Epi: x / Bacteria: x        COVID-19 PCR: Detected (15 Apr 2020 14:20)

## 2020-04-19 NOTE — PROGRESS NOTE ADULT - ASSESSMENT
Assessment and Plan:   80 y/o female from [facility] assisted living facility.   She has multiple past medical problems including: COPD (no home O2), IBD, GERD,   dementia, bipolar, schizophrenia, hypothyroidism, HLD, constipation, insomnia, anemia.  She was BIBEMS for evaluation of SOB and " gasping for air" x several days.  COVID (+) pneumonia.  D-Dimer = 485ng/mL.  Hydroxychloroquine initiated on 04/16/2020 and due to complete on 04/20/2020    SOB secondary to COVID-19 PNA  - patient currently saturating SPO2 92% on 2L. She was titrated to RA - continue to monitor O2 sat.  - afebrile  - CXR: with left basilar opacity  - elevated wbc, thrombocytosis, no lymphopenia  - Procalcitonin 0.05, CRP 0.17, ferritin 445  - , Plaquenil 2/5  - Ceftriaxone d/c  - tylenol PRN for fevers  - c/w tessalon  - f/u urinalysis    PMH of COPD, stable  - not on home o2  - c/w SYMBICORT    IBD, GERD  - c/w ppx    Bipolar, Schizophrenia  - c/w Abilify   - c/w lamotrigine  - c/w lithium    Hypothyroidism  - c/w syntheroid     HLD,   - c/w statin    Constipation, improved  - c/w bowel regimen  - Insomnia, anemia  - c/w home meds    DVTppx: Lovenox  GI ppx: PPI  Full code    Anticipate D/C to to [facility] once accepted - pending for Monday 04/20/20.

## 2020-04-20 PROCEDURE — 99233 SBSQ HOSP IP/OBS HIGH 50: CPT | Mod: CS

## 2020-04-20 RX ADMIN — LAMOTRIGINE 25 MILLIGRAM(S): 25 TABLET, ORALLY DISINTEGRATING ORAL at 12:12

## 2020-04-20 RX ADMIN — BUDESONIDE AND FORMOTEROL FUMARATE DIHYDRATE 2 PUFF(S): 160; 4.5 AEROSOL RESPIRATORY (INHALATION) at 08:05

## 2020-04-20 RX ADMIN — PANTOPRAZOLE SODIUM 40 MILLIGRAM(S): 20 TABLET, DELAYED RELEASE ORAL at 05:07

## 2020-04-20 RX ADMIN — CHLORHEXIDINE GLUCONATE 1 APPLICATION(S): 213 SOLUTION TOPICAL at 05:07

## 2020-04-20 RX ADMIN — BUDESONIDE AND FORMOTEROL FUMARATE DIHYDRATE 2 PUFF(S): 160; 4.5 AEROSOL RESPIRATORY (INHALATION) at 20:42

## 2020-04-20 RX ADMIN — ARIPIPRAZOLE 30 MILLIGRAM(S): 15 TABLET ORAL at 12:10

## 2020-04-20 RX ADMIN — LITHIUM CARBONATE 150 MILLIGRAM(S): 300 TABLET, EXTENDED RELEASE ORAL at 12:12

## 2020-04-20 RX ADMIN — ENOXAPARIN SODIUM 40 MILLIGRAM(S): 100 INJECTION SUBCUTANEOUS at 12:11

## 2020-04-20 RX ADMIN — Medication 50 MICROGRAM(S): at 05:08

## 2020-04-20 RX ADMIN — AMLODIPINE BESYLATE 10 MILLIGRAM(S): 2.5 TABLET ORAL at 05:06

## 2020-04-20 NOTE — PROGRESS NOTE ADULT - ASSESSMENT
78 y/o female from [facility].   She has multiple past medical problems including: COPD (no home O2), IBD, GERD,   dementia, bipolar, schizophrenia, hypothyroidism, HLD, constipation, insomnia, anemia.  She was BIBEMS for evaluation of SOB and " gasping for air" x several days.  COVID (+) pneumonia.  COVID (+) pneumonia.  D-Dimer = 485ng/mL.    completed plaquenil  tylenol prn  ddi 485  d/w np  dc today

## 2020-04-20 NOTE — DISCHARGE NOTE PROVIDER - NSDCMRMEDTOKEN_GEN_ALL_CORE_FT
AMLODIPINE   TAB 10MG:   ARIPIPRAZOLE TAB 30MG:   BREO ELLIPTA -25:   LAMOTRIGINE  TAB 25MG:   LEVOTHYROXIN TAB 50MCG:   LITHIUM CARB CAP 150MG:   OMEPRAZOLE   CAP 20MG:

## 2020-04-20 NOTE — DISCHARGE NOTE NURSING/CASE MANAGEMENT/SOCIAL WORK - PATIENT PORTAL LINK FT
You can access the FollowMyHealth Patient Portal offered by [facility] by registering at the following website: [email or web address]. By joining [employer]’s FollowMyHealth portal, you will also be able to view your health information using other applications (apps) compatible with our system.

## 2020-04-20 NOTE — DISCHARGE NOTE PROVIDER - HOSPITAL COURSE
78 [facility] including  COPD not on home o2, IBD, GERD, dementia, bipolar, schizophrenia, hypothyroidism, HLD, constipation, insomnia, anemia BIBEMS for evaluation of SOB and " gasping for air" for several days.  Patient reports symptoms at rest and on ambulation.  Denies fever, chills cough, CP, back or abdominal pain, no diarrhea or urinary complaints, no leg pain or swelling.        Patient transferred to [facility] on 4/18/20. Patient completed plaquenil 4/19/20.   Patient is 95% O2 on room air. 78 [facility]  COPD not on home o2, IBD, GERD, dementia, bipolar, schizophrenia, hypothyroidism, HLD, constipation, insomnia, anemia BIBEMS for evaluation of SOB and " gasping for air" for several days.  Patient reports symptoms at rest and on ambulation.  Denies fever, chills cough, CP, back or abdominal pain, no diarrhea or urinary complaints, no leg pain or swelling.        Patient [email or web address] on 4/18/20. Patient completed plaquenil 4/19/20.   Patient is 95% O2 on room air. 80 [facility]  COPD not on home o2, IBD, GERD, dementia, bipolar, schizophrenia, hypothyroidism, HLD, constipation, insomnia, anemia BIBEMS for evaluation of SOB and " gasping for air" for several days.  Patient reports symptoms at rest and on ambulation.  Denies fever, chills cough, CP, back or abdominal pain, no diarrhea or urinary complaints, no leg pain or swelling.        Patient transferred to [facility] on 4/18/20. Patient completed plaquenil 4/19/20.   Patient is 95% O2 on room air. Patient is a 78 yo female from [facility] assisted living facility PMH multiple problems including COPD not on home O2, IBD, GERD, dementia, bipolar, schizophrenia, hypothyroidism, HLD, constipation, insomnia, anemia, BIBEMS on 4/15/2020 for evaluation of SOB and " gasping for air" for several days. Denies fever, chills cough, CP, back or abdominal pain, no diarrhea or urinary complaints, no leg pain or swelling. Patient transferred to [facility] on 4/18/20. Patient completed plaquenil 4/19/20.         4/21/2020: Patient is doing well. Patient denies SOB, cough, chest pain, or chills. O2 sat is 93% on RA. She is comfortable and in no acute distress. Plan to discharwge to [facility].

## 2020-04-20 NOTE — CHART NOTE - NSCHARTNOTEFT_GEN_A_CORE
Patients 449 form was filled out and emailed to social work at 2:40 pm.  [facility] social work called stating they need bp and weight on form.  This was completed in a timely fashion and a voice mail message was left for [facility] social work with patients bp and weight. Patients 449 form was filled out and emailed to social work at 2:40 pm.  [facility] social work called stating they need bp and weight on form.  This was completed in a timely fashion and a voice mail message was left for [facility] work with patients bp and weight as well.  Unfortunately, [facility] would not accept the patient stating they did not receive patients bp and weight on time.

## 2020-04-20 NOTE — PROGRESS NOTE ADULT - SUBJECTIVE AND OBJECTIVE BOX
Patient was seen and examined. Spoke with RN. Chart reviewed.  comf, no new complaints,    No events overnight.  Vital Signs Last 24 Hrs  T(F): 97.7 (20 Apr 2020 04:00), Max: 98.3 (19 Apr 2020 20:00)  HR: 74 (20 Apr 2020 04:00) (74 - 80)  BP: 127/72 (20 Apr 2020 04:00) (114/63 - 157/180)  SpO2: 95% (20 Apr 2020 04:00) (90% - 97%)  MEDICATIONS  (STANDING):  amLODIPine   Tablet 10 milliGRAM(s) Oral daily  ARIPiprazole 30 milliGRAM(s) Oral daily  budesonide 160 MICROgram(s)/formoterol 4.5 MICROgram(s) Inhaler 2 Puff(s) Inhalation two times a day  chlorhexidine 4% Liquid 1 Application(s) Topical <User Schedule>  enoxaparin Injectable 40 milliGRAM(s) SubCutaneous daily  lamoTRIgine 25 milliGRAM(s) Oral daily  levothyroxine 50 MICROGram(s) Oral daily  lithium 150 milliGRAM(s) Oral daily  pantoprazole    Tablet 40 milliGRAM(s) Oral before breakfast    MEDICATIONS  (PRN):  ondansetron Injectable 4 milliGRAM(s) IV Push four times a day PRN Nausea    Labs:                  Radiology:    General: comfortable, NAD  Neurology: A&Ox3, nonfocal  Head:  Normocephalic, atraumatic  ENT:  Mucosa moist, no ulcerations  Neck:  Supple, no JVD,   Skin: no breakdowns (as per RN)  Resp: CTA B/L  CV: RRR, S1S2,   GI: Soft, NT, bowel sounds  MS: No edema, + peripheral pulses, FROM all 4 extremity

## 2020-04-20 NOTE — DISCHARGE NOTE PROVIDER - PROVIDER TOKENS
FREE:[LAST:[ROSA],FIRST:[GUNNAR],PHONE:[(   )    -],FAX:[(   )    -],ADDRESS:[[facility]]]

## 2020-04-20 NOTE — DISCHARGE NOTE PROVIDER - NSDCFUADDINST_GEN_ALL_CORE_FT
The symptoms you have been experiencing are due to infection with the novel coronavirus and subsequent development of COVID-19. Symptoms of the illness include fever, malaise, dry cough, and occasionally diarrhea, headache, loss of appetite, and disturbances in taste/smell. The virus is spread through respiratory droplets and contact with contaminated items/surfaces.    Your oxygen requirements have improved such that you do not require supplemental oxygen on ambulation. You are currently medically cleared for discharge. Please continue to self-quarantine for another 7 days. Wash your hands thoroughly (for at least 20 seconds with soap and warm water) and frequently. Avoid touching your face and cover your cough/sneeze. Wear a face mask if going outside and avoid close contact with others for at least 7 days.    If you experience any worsening or recurrence of your symptoms, particularly worsening or high fever, shortness of breath, extreme fatigue, bloody cough, chest pain, palpitations, if you are unable to keep down food/fluids, or if you experience dizziness, or fainting, please call 9-1-1 immediately or report to the nearest Emergency Department. If you have any questions or concerns, please do not hesitate to call the hospital at [phone].

## 2020-04-20 NOTE — DISCHARGE NOTE NURSING/CASE MANAGEMENT/SOCIAL WORK - NSDCPNDISPN_GEN_ALL_CORE
Opioids not applicable/not prescribed Education provided on the pain management plan of care/Opioids not applicable/not prescribed

## 2020-04-21 VITALS
SYSTOLIC BLOOD PRESSURE: 116 MMHG | OXYGEN SATURATION: 95 % | RESPIRATION RATE: 28 BRPM | TEMPERATURE: 98 F | HEART RATE: 77 BPM | DIASTOLIC BLOOD PRESSURE: 69 MMHG

## 2020-04-21 LAB — S PNEUM AG UR QL: NEGATIVE — SIGNIFICANT CHANGE UP

## 2020-04-21 PROCEDURE — 99232 SBSQ HOSP IP/OBS MODERATE 35: CPT | Mod: CS

## 2020-04-21 RX ADMIN — BUDESONIDE AND FORMOTEROL FUMARATE DIHYDRATE 2 PUFF(S): 160; 4.5 AEROSOL RESPIRATORY (INHALATION) at 08:49

## 2020-04-21 RX ADMIN — ARIPIPRAZOLE 30 MILLIGRAM(S): 15 TABLET ORAL at 14:39

## 2020-04-21 RX ADMIN — LITHIUM CARBONATE 150 MILLIGRAM(S): 300 TABLET, EXTENDED RELEASE ORAL at 14:39

## 2020-04-21 RX ADMIN — CHLORHEXIDINE GLUCONATE 1 APPLICATION(S): 213 SOLUTION TOPICAL at 06:18

## 2020-04-21 RX ADMIN — ENOXAPARIN SODIUM 40 MILLIGRAM(S): 100 INJECTION SUBCUTANEOUS at 14:40

## 2020-04-21 RX ADMIN — PANTOPRAZOLE SODIUM 40 MILLIGRAM(S): 20 TABLET, DELAYED RELEASE ORAL at 06:12

## 2020-04-21 RX ADMIN — LAMOTRIGINE 25 MILLIGRAM(S): 25 TABLET, ORALLY DISINTEGRATING ORAL at 14:40

## 2020-04-21 RX ADMIN — AMLODIPINE BESYLATE 10 MILLIGRAM(S): 2.5 TABLET ORAL at 06:11

## 2020-04-21 RX ADMIN — Medication 50 MICROGRAM(S): at 06:11

## 2020-04-21 NOTE — PROGRESS NOTE ADULT - SUBJECTIVE AND OBJECTIVE BOX
Patient is a 78 [facility] including COPD not on home O2, IBD, GERD, dementia, bipolar, schizophrenia, hypothyroidism, HLD, constipation, insomnia, anemia, BIBEMS for evaluation of SOB and " gasping for air" for several days.  Patient reports symptoms at rest and on ambulation.  Denies fever, chills cough, CP, back or abdominal pain, no diarrhea or urinary complaints, no leg pain or swelling. Patient transferred to [facility] on 4/18/20. Patient completed plaquenil 4/19/20.     SUBJECTIVE / OVERNIGHT EVENTS  Patient is doing well. No overnight events. Patient denies SOB, cough, chest pain, or chills. O2 sat is 93% on RA. She is comfortable and in no acute distress.    COVID (+) pneumonia.    D-Dimer = 485ng/mL.    Hydroxychloroquine initiated on 04/16/2020 and due to complete on 04/20/2020.    MEDICATIONS  (STANDING)  amLODIPine   Tablet 10 milliGRAM(s) Oral daily  ARIPiprazole 30 milliGRAM(s) Oral daily  budesonide 160 MICROgram(s)/formoterol 4.5 MICROgram(s) Inhaler 2 Puff(s) Inhalation two times a day  chlorhexidine 4% Liquid 1 Application(s) Topical <User Schedule>  enoxaparin Injectable 40 milliGRAM(s) SubCutaneous daily  lamoTRIgine 25 milliGRAM(s) Oral daily  levothyroxine 50 MICROGram(s) Oral daily  lithium 150 milliGRAM(s) Oral daily  pantoprazole    Tablet 40 milliGRAM(s) Oral before breakfast    MEDICATIONS  (PRN)  ondansetron Injectable 4 milliGRAM(s) IV Push four times a day PRN Nausea    Vital Signs Last 24 Hrs  T(C): 36.9 (21 Apr 2020 04:35), Max: 37.1 (21 Apr 2020 00:17)  T(F): 98.4 (21 Apr 2020 04:35), Max: 98.7 (21 Apr 2020 00:17)  HR: 67 (21 Apr 2020 06:10) (67 - 89)  BP: 112/55 (21 Apr 2020 06:10) (109/57 - 138/51)  BP(mean): --  RR: 16 (21 Apr 2020 04:35) (16 - 20)  SpO2: 93% (21 Apr 2020 04:35) (93% - 96%)    PHYSICAL EXAM  CONSTITUTIONAL: NAD, well-developed, well-groomed  ENMT: Moist oral mucosa, no pharyngeal injection or exudates; normal dentition  RESPIRATORY: Normal respiratory effort; lungs are clear to auscultation bilaterally  CARDIOVASCULAR: Regular rate and rhythm, normal S1 and S2, no murmur/rub/gallop; No lower extremity edema; Peripheral pulses are 2+ bilaterally  ABDOMEN: Nontender to palpation, normoactive bowel sounds, no rebound/guarding; No hepatosplenomegaly  PSYCH: A+O to person, place, and time; affect appropriate  NEUROLOGY: CN 2-12 are intact and symmetric; no gross sensory deficits   SKIN: No rashes; no palpable lesions    LABS   COVID-19 PCR: Detected (15 Apr 2020 14:20)

## 2020-04-21 NOTE — PROGRESS NOTE ADULT - ASSESSMENT
Patient is a 78 yo female from [facility] assisted living facility [facility] multiple problems including COPD not on home O2, IBD, GERD, dementia, bipolar, schizophrenia, hypothyroidism, HLD, constipation, insomnia, anemia, BIBEMS for evaluation of SOB and " gasping for air" for several days.  Patient reports symptoms at rest and on ambulation.  Denies fever, chills cough, CP, back or abdominal pain, no diarrhea or urinary complaints, no leg pain or swelling. Patient transferred to [facility] on 4/18/20. Patient completed plaquenil 4/19/20.     #1: SOB secondary to COVID-19 PNA  - Patient currently 93% on RA.   - Afebrile  - Completed Azithromycin / Plaquenil     #2: PMHx of COPD, stable  - not on home O2   - c/w SYMBICORT    #3: IBD, GERD  - c/w Protonix     #4: Bipolar, Schizophrenia  - c/w Abilify   - c/w lamotrigine  - c/w lithium    #5: Hypothyroidism  - c/w synthroid    #6: HLD,   - c/w statin    #7: DVT prophylaxis   - Lovenox    #8: DNR / DNI:   - Full code    #9: Rehab:   - PT / OT consulted     Discharge Plan: Patient resides at [facility]. Discharge is anticipated today.     Patient was seen and examined by Dr. Burgos at bedside

## 2020-04-21 NOTE — PROGRESS NOTE ADULT - ASSESSMENT
78 y/o female from [facility].   She has multiple past medical problems including: COPD (no home O2), IBD, GERD,   dementia, bipolar, schizophrenia, hypothyroidism, HLD, constipation, insomnia, anemia.  She was BIBEMS for evaluation of SOB and " gasping for air" x several days.  COVID (+) pneumonia.  COVID (+) pneumonia.  D-Dimer = 485ng/mL.    completed plaquenil  tylenol prn  d/w np  dc today

## 2020-04-21 NOTE — PROGRESS NOTE ADULT - SUBJECTIVE AND OBJECTIVE BOX
Patient was seen and examined. Spoke with RN. Chart reviewed.    No events overnight.  Vital Signs Last 24 Hrs  T(F): 97.9 (21 Apr 2020 08:00), Max: 98.7 (21 Apr 2020 00:17)  HR: 77 (21 Apr 2020 08:00) (67 - 89)  BP: 116/69 (21 Apr 2020 08:00) (109/57 - 138/51)  SpO2: 95% (21 Apr 2020 08:00) (93% - 96%)  MEDICATIONS  (STANDING):  amLODIPine   Tablet 10 milliGRAM(s) Oral daily  ARIPiprazole 30 milliGRAM(s) Oral daily  budesonide 160 MICROgram(s)/formoterol 4.5 MICROgram(s) Inhaler 2 Puff(s) Inhalation two times a day  enoxaparin Injectable 40 milliGRAM(s) SubCutaneous daily  lamoTRIgine 25 milliGRAM(s) Oral daily  levothyroxine 50 MICROGram(s) Oral daily  lithium 150 milliGRAM(s) Oral daily  pantoprazole    Tablet 40 milliGRAM(s) Oral before breakfast    MEDICATIONS  (PRN):  ondansetron Injectable 4 milliGRAM(s) IV Push four times a day PRN Nausea    Labs:                  Radiology:    General: comfortable, NAD  Neurology: A&Ox2, nonfocal  Head:  Normocephalic, atraumatic  ENT:  Mucosa moist, no ulcerations  Neck:  Supple, no JVD,   Skin: no breakdowns (as per RN)  Resp: CTA B/L  CV: RRR, S1S2,   GI: Soft, NT, bowel sounds  MS: No edema, + peripheral pulses, FROM all 4 extremity

## 2020-04-24 DIAGNOSIS — G47.00 INSOMNIA, UNSPECIFIED: ICD-10-CM

## 2020-04-24 DIAGNOSIS — K21.9 GASTRO-ESOPHAGEAL REFLUX DISEASE WITHOUT ESOPHAGITIS: ICD-10-CM

## 2020-04-24 DIAGNOSIS — F03.90 UNSPECIFIED DEMENTIA WITHOUT BEHAVIORAL DISTURBANCE: ICD-10-CM

## 2020-04-24 DIAGNOSIS — Z79.890 HORMONE REPLACEMENT THERAPY: ICD-10-CM

## 2020-04-24 DIAGNOSIS — R06.02 SHORTNESS OF BREATH: ICD-10-CM

## 2020-04-24 DIAGNOSIS — D64.9 ANEMIA, UNSPECIFIED: ICD-10-CM

## 2020-04-24 DIAGNOSIS — E03.9 HYPOTHYROIDISM, UNSPECIFIED: ICD-10-CM

## 2020-04-24 DIAGNOSIS — E78.00 PURE HYPERCHOLESTEROLEMIA, UNSPECIFIED: ICD-10-CM

## 2020-04-24 DIAGNOSIS — F31.9 BIPOLAR DISORDER, UNSPECIFIED: ICD-10-CM

## 2020-04-24 DIAGNOSIS — K59.00 CONSTIPATION, UNSPECIFIED: ICD-10-CM

## 2020-04-24 DIAGNOSIS — U07.1 COVID-19: ICD-10-CM

## 2020-04-24 DIAGNOSIS — F20.89 OTHER SCHIZOPHRENIA: ICD-10-CM

## 2020-04-24 DIAGNOSIS — J44.0 CHRONIC OBSTRUCTIVE PULMONARY DISEASE WITH (ACUTE) LOWER RESPIRATORY INFECTION: ICD-10-CM

## 2020-04-24 DIAGNOSIS — J12.89 OTHER VIRAL PNEUMONIA: ICD-10-CM

## 2021-05-13 ENCOUNTER — EMERGENCY (EMERGENCY)
Facility: HOSPITAL | Age: 81
LOS: 0 days | Discharge: NURSING, REHAB OR LONG-TERM CARE | End: 2021-05-13
Attending: EMERGENCY MEDICINE | Admitting: EMERGENCY MEDICINE
Payer: MEDICARE

## 2021-05-13 VITALS
TEMPERATURE: 97 F | HEIGHT: 60 IN | WEIGHT: 123.02 LBS | DIASTOLIC BLOOD PRESSURE: 71 MMHG | OXYGEN SATURATION: 98 % | HEART RATE: 96 BPM | SYSTOLIC BLOOD PRESSURE: 151 MMHG | RESPIRATION RATE: 19 BRPM

## 2021-05-13 DIAGNOSIS — E03.9 HYPOTHYROIDISM, UNSPECIFIED: ICD-10-CM

## 2021-05-13 DIAGNOSIS — Z87.891 PERSONAL HISTORY OF NICOTINE DEPENDENCE: ICD-10-CM

## 2021-05-13 DIAGNOSIS — K52.9 NONINFECTIVE GASTROENTERITIS AND COLITIS, UNSPECIFIED: ICD-10-CM

## 2021-05-13 DIAGNOSIS — R10.30 LOWER ABDOMINAL PAIN, UNSPECIFIED: ICD-10-CM

## 2021-05-13 DIAGNOSIS — Z87.19 PERSONAL HISTORY OF OTHER DISEASES OF THE DIGESTIVE SYSTEM: ICD-10-CM

## 2021-05-13 DIAGNOSIS — Z88.8 ALLERGY STATUS TO OTHER DRUGS, MEDICAMENTS AND BIOLOGICAL SUBSTANCES: ICD-10-CM

## 2021-05-13 DIAGNOSIS — R19.7 DIARRHEA, UNSPECIFIED: ICD-10-CM

## 2021-05-13 DIAGNOSIS — K21.9 GASTRO-ESOPHAGEAL REFLUX DISEASE WITHOUT ESOPHAGITIS: ICD-10-CM

## 2021-05-13 DIAGNOSIS — E78.5 HYPERLIPIDEMIA, UNSPECIFIED: ICD-10-CM

## 2021-05-13 DIAGNOSIS — J44.9 CHRONIC OBSTRUCTIVE PULMONARY DISEASE, UNSPECIFIED: ICD-10-CM

## 2021-05-13 DIAGNOSIS — Z91.011 ALLERGY TO MILK PRODUCTS: ICD-10-CM

## 2021-05-13 DIAGNOSIS — Z86.2 PERSONAL HISTORY OF DISEASES OF THE BLOOD AND BLOOD-FORMING ORGANS AND CERTAIN DISORDERS INVOLVING THE IMMUNE MECHANISM: ICD-10-CM

## 2021-05-13 DIAGNOSIS — Z86.59 PERSONAL HISTORY OF OTHER MENTAL AND BEHAVIORAL DISORDERS: ICD-10-CM

## 2021-05-13 PROBLEM — E78.00 PURE HYPERCHOLESTEROLEMIA, UNSPECIFIED: Chronic | Status: ACTIVE | Noted: 2020-04-15

## 2021-05-13 PROBLEM — F20.9 SCHIZOPHRENIA, UNSPECIFIED: Chronic | Status: ACTIVE | Noted: 2020-04-15

## 2021-05-13 PROBLEM — D64.9 ANEMIA, UNSPECIFIED: Chronic | Status: ACTIVE | Noted: 2020-04-15

## 2021-05-13 PROBLEM — F03.90 UNSPECIFIED DEMENTIA WITHOUT BEHAVIORAL DISTURBANCE: Chronic | Status: ACTIVE | Noted: 2020-04-15

## 2021-05-13 PROBLEM — G47.00 INSOMNIA, UNSPECIFIED: Chronic | Status: ACTIVE | Noted: 2020-04-15

## 2021-05-13 PROBLEM — F31.9 BIPOLAR DISORDER, UNSPECIFIED: Chronic | Status: ACTIVE | Noted: 2020-04-15

## 2021-05-13 LAB
ALBUMIN SERPL ELPH-MCNC: 4.2 G/DL — SIGNIFICANT CHANGE UP (ref 3.5–5.2)
ALP SERPL-CCNC: 55 U/L — SIGNIFICANT CHANGE UP (ref 30–115)
ALT FLD-CCNC: <5 U/L — SIGNIFICANT CHANGE UP (ref 0–41)
ANION GAP SERPL CALC-SCNC: 10 MMOL/L — SIGNIFICANT CHANGE UP (ref 7–14)
AST SERPL-CCNC: 14 U/L — SIGNIFICANT CHANGE UP (ref 0–41)
BASOPHILS # BLD AUTO: 0.04 K/UL — SIGNIFICANT CHANGE UP (ref 0–0.2)
BASOPHILS NFR BLD AUTO: 0.3 % — SIGNIFICANT CHANGE UP (ref 0–1)
BILIRUB SERPL-MCNC: 0.3 MG/DL — SIGNIFICANT CHANGE UP (ref 0.2–1.2)
BUN SERPL-MCNC: 20 MG/DL — SIGNIFICANT CHANGE UP (ref 10–20)
C DIFF BY PCR RESULT: NEGATIVE — SIGNIFICANT CHANGE UP
C DIFF TOX GENS STL QL NAA+PROBE: SIGNIFICANT CHANGE UP
CALCIUM SERPL-MCNC: 9.5 MG/DL — SIGNIFICANT CHANGE UP (ref 8.5–10.1)
CHLORIDE SERPL-SCNC: 104 MMOL/L — SIGNIFICANT CHANGE UP (ref 98–110)
CO2 SERPL-SCNC: 25 MMOL/L — SIGNIFICANT CHANGE UP (ref 17–32)
CREAT SERPL-MCNC: 0.9 MG/DL — SIGNIFICANT CHANGE UP (ref 0.7–1.5)
EOSINOPHIL # BLD AUTO: 0.23 K/UL — SIGNIFICANT CHANGE UP (ref 0–0.7)
EOSINOPHIL NFR BLD AUTO: 2 % — SIGNIFICANT CHANGE UP (ref 0–8)
GLUCOSE SERPL-MCNC: 111 MG/DL — HIGH (ref 70–99)
HCT VFR BLD CALC: 34.8 % — LOW (ref 37–47)
HGB BLD-MCNC: 10.9 G/DL — LOW (ref 12–16)
IMM GRANULOCYTES NFR BLD AUTO: 0.4 % — HIGH (ref 0.1–0.3)
LACTATE SERPL-SCNC: 1 MMOL/L — SIGNIFICANT CHANGE UP (ref 0.7–2)
LIDOCAIN IGE QN: 36 U/L — SIGNIFICANT CHANGE UP (ref 7–60)
LYMPHOCYTES # BLD AUTO: 2.47 K/UL — SIGNIFICANT CHANGE UP (ref 1.2–3.4)
LYMPHOCYTES # BLD AUTO: 21.6 % — SIGNIFICANT CHANGE UP (ref 20.5–51.1)
MCHC RBC-ENTMCNC: 31.1 PG — HIGH (ref 27–31)
MCHC RBC-ENTMCNC: 31.3 G/DL — LOW (ref 32–37)
MCV RBC AUTO: 99.1 FL — HIGH (ref 81–99)
MONOCYTES # BLD AUTO: 0.82 K/UL — HIGH (ref 0.1–0.6)
MONOCYTES NFR BLD AUTO: 7.2 % — SIGNIFICANT CHANGE UP (ref 1.7–9.3)
NEUTROPHILS # BLD AUTO: 7.83 K/UL — HIGH (ref 1.4–6.5)
NEUTROPHILS NFR BLD AUTO: 68.5 % — SIGNIFICANT CHANGE UP (ref 42.2–75.2)
NRBC # BLD: 0 /100 WBCS — SIGNIFICANT CHANGE UP (ref 0–0)
PLATELET # BLD AUTO: 229 K/UL — SIGNIFICANT CHANGE UP (ref 130–400)
POTASSIUM SERPL-MCNC: 3.6 MMOL/L — SIGNIFICANT CHANGE UP (ref 3.5–5)
POTASSIUM SERPL-SCNC: 3.6 MMOL/L — SIGNIFICANT CHANGE UP (ref 3.5–5)
PROT SERPL-MCNC: 6.6 G/DL — SIGNIFICANT CHANGE UP (ref 6–8)
RBC # BLD: 3.51 M/UL — LOW (ref 4.2–5.4)
RBC # FLD: 12.5 % — SIGNIFICANT CHANGE UP (ref 11.5–14.5)
SODIUM SERPL-SCNC: 139 MMOL/L — SIGNIFICANT CHANGE UP (ref 135–146)
TROPONIN T SERPL-MCNC: <0.01 NG/ML — SIGNIFICANT CHANGE UP
WBC # BLD: 11.44 K/UL — HIGH (ref 4.8–10.8)
WBC # FLD AUTO: 11.44 K/UL — HIGH (ref 4.8–10.8)

## 2021-05-13 PROCEDURE — 99284 EMERGENCY DEPT VISIT MOD MDM: CPT

## 2021-05-13 PROCEDURE — 74177 CT ABD & PELVIS W/CONTRAST: CPT | Mod: 26,MA

## 2021-05-13 RX ORDER — METRONIDAZOLE 500 MG
1 TABLET ORAL
Qty: 21 | Refills: 0
Start: 2021-05-13 | End: 2021-05-19

## 2021-05-13 RX ORDER — CIPROFLOXACIN LACTATE 400MG/40ML
500 VIAL (ML) INTRAVENOUS ONCE
Refills: 0 | Status: COMPLETED | OUTPATIENT
Start: 2021-05-13 | End: 2021-05-13

## 2021-05-13 RX ORDER — CIPROFLOXACIN LACTATE 400MG/40ML
1 VIAL (ML) INTRAVENOUS
Qty: 14 | Refills: 0
Start: 2021-05-13 | End: 2021-05-19

## 2021-05-13 RX ORDER — METRONIDAZOLE 500 MG
500 TABLET ORAL ONCE
Refills: 0 | Status: COMPLETED | OUTPATIENT
Start: 2021-05-13 | End: 2021-05-13

## 2021-05-13 RX ADMIN — Medication 500 MILLIGRAM(S): at 06:42

## 2021-05-13 NOTE — ED PROVIDER NOTE - CARE PROVIDER_API CALL
Mabel Mendoza)  Gastroenterology; Internal Medicine  [address] [place] [address]  Phone: [phone]  Fax: [phone]  Follow Up Time:

## 2021-05-13 NOTE — ED PROVIDER NOTE - CLINICAL SUMMARY MEDICAL DECISION MAKING FREE TEXT BOX
Pt with several hours of severe diarrhea. Required labs, imaging. Was found to have colitis. Will be d/c with abx and outpt f/up.

## 2021-05-13 NOTE — ED PROVIDER NOTE - PHYSICAL EXAMINATION
CONSTITUTIONAL: Well-appearing; well-nourished; in no apparent distress.   EYES: PERRL; EOM intact.   RESPIRATORY: Normal chest excursion with respiration; breath sounds clear and equal bilaterally; no wheezes, rhonchi, or rales.  GI/: Normal bowel sounds; non-distended; non-tender; no palpable organomegaly.   MS: No evidence of trauma or deformity.   SKIN: Normal for age and race; warm; dry; good turgor; no apparent lesions or exudate.   NEURO/PSYCH: A & O x 4; grossly unremarkable.

## 2021-05-13 NOTE — ED PROVIDER NOTE - PATIENT PORTAL LINK FT
You can access the FollowMyHealth Patient Portal offered by [facility] by registering at the following website: [email or web address]. By joining [employer]’s FollowMyHealth portal, you will also be able to view your health information using other applications (apps) compatible with our system.

## 2021-05-13 NOTE — ED ADULT NURSE NOTE - CHIEF COMPLAINT QUOTE
Pt came from [facility] c/o abdominal pain and diarrhea for the straight 5 hours non-stop, after she had a coffee with spices.

## 2021-05-13 NOTE — ED PROVIDER NOTE - NS ED ROS FT
Constitutional: no fever, chills, no recent weight loss, change in appetite or malaise  Cardiac: No chest pain, SOB or edema.  Respiratory: No cough or respiratory distress  GI: see HPI  : No dysuria, frequency, urgency or hematuria  MS: no pain to back or extremities, no loss of ROM, no weakness  Neuro: No headache or weakness. No LOC.  Skin: No skin rash.  Endocrine: No history of thyroid disease or diabetes.

## 2021-05-13 NOTE — ED ADULT TRIAGE NOTE - CHIEF COMPLAINT QUOTE
Pt came from [facility] c/o abdominal pain and diarrhea for the straight 5 hours non-stop, after she had a coffee with spices.

## 2021-05-13 NOTE — ED PROVIDER NOTE - OBJECTIVE STATEMENT
81 yo female hx of COPD/ HTN/Bipolar sent from [facility] 2/2 lower abdominal pain and diarrhea x few hours. reported had coffee and cake earlier tonight. denies sick contact. denies fever/chill/chest pain/sob and urinary sxs.

## 2021-05-13 NOTE — ED PROVIDER NOTE - ATTENDING CONTRIBUTION TO CARE
I personally evaluated the patient. I reviewed the Resident’s or Physician Assistant’s note (as assigned above), and agree with the findings and plan except as documented in my note.  80 F from [facility] with pmhx of COPD, IBD, GERD, dementia, bipolar d/o, schizophrenia, hypothyroidism, HLD, anemia presents with complaints of several episodes of nbnm diarrhea, very malodorous as per the pt. Reports feeling warm but unsure if she had a fever, no n/v. No urinary complaints. Pt unsure of any recent abx use. VS reviewed, pt non-toxic appearing, NAD. Head ncat, MMM, neck supple, normal ROM, normal s1s2 without any murmurs, Lungs CTAB with normal work of breathing, abd +BS, s/nd/nttp, no guarding or rebound, extremities wnl, neuro exam grossly normal. No acute skin rashes. Plan is labs, imaging, meds as needed and dispo accordingly.

## 2021-05-15 LAB
CULTURE RESULTS: SIGNIFICANT CHANGE UP
SPECIMEN SOURCE: SIGNIFICANT CHANGE UP

## 2021-07-19 NOTE — ED PROVIDER NOTE - PROVIDER TOKENS
CMP reviewed with Dr. Bowers    Creatinine (mg/dL)   Date Value   07/19/2021 1.67 (H)       Patient advised to push fluids. Recommend recheck in 1 week. Patient agreeable.     
PROVIDER:[TOKEN:[[id number]]]

## 2022-05-25 ENCOUNTER — OUTPATIENT (OUTPATIENT)
Dept: OUTPATIENT SERVICES | Facility: HOSPITAL | Age: 82
LOS: 1 days | Discharge: HOME | End: 2022-05-25

## 2022-05-25 VITALS
HEART RATE: 70 BPM | RESPIRATION RATE: 17 BRPM | WEIGHT: 132.94 LBS | TEMPERATURE: 97 F | OXYGEN SATURATION: 100 % | SYSTOLIC BLOOD PRESSURE: 129 MMHG | DIASTOLIC BLOOD PRESSURE: 60 MMHG

## 2022-05-25 VITALS — RESPIRATION RATE: 17 BRPM | DIASTOLIC BLOOD PRESSURE: 59 MMHG | HEART RATE: 82 BPM | SYSTOLIC BLOOD PRESSURE: 118 MMHG

## 2022-05-25 RX ORDER — LITHIUM CARBONATE 300 MG/1
0 TABLET, EXTENDED RELEASE ORAL
Qty: 30 | Refills: 0 | DISCHARGE

## 2022-05-25 RX ORDER — ARIPIPRAZOLE 15 MG/1
0 TABLET ORAL
Qty: 30 | Refills: 0 | DISCHARGE

## 2022-05-25 RX ORDER — FLUTICASONE FUROATE AND VILANTEROL TRIFENATATE 100; 25 UG/1; UG/1
0 POWDER RESPIRATORY (INHALATION)
Qty: 60 | Refills: 0 | DISCHARGE

## 2022-05-25 NOTE — ASU PREOP CHECKLIST - ORDERS/MEDICATION ADMINISTRATION RECORD ON CHART
Subjective   Patient ID: Isiah is a 59 year old male.    Chief Complaint   Patient presents with   • Sore Throat     This morning, during the night hard time swallowing. Red throat this morning   • fatigue     Lethargic, wake up in the morning feel tired. This morning started      58 y/o male presents to clinic today with c/o sore throat which started on last night. He also reports nasal congestion.     He denies fever, chills, body ache, nausea, vomiting, diarrhea, chest pain or SOB.     He reports taking OTC Mucinex and NyQuil with relief of congestion.       He denies any known sick contact.           Past Medical History:   Diagnosis Date   • Diabetes mellitus (CMS/[facility])    • Essential (primary) hypertension    • GERD (gastroesophageal reflux disease)        MEDICATIONS:  Current Outpatient Medications   Medication Sig   • rivaroxaban (Xarelto) 10 MG Tab Take 1 tablet by mouth daily (with dinner).   • omeprazole (PrilOSEC) 20 MG capsule TAKE 1 CAPSULE BY MOUTH  DAILY   • lisinopril (ZESTRIL) 10 MG tablet TAKE 1 TABLET BY MOUTH  DAILY   • metoPROLOL tartrate (LOPRESSOR) 50 MG tablet TAKE 1 TABLET BY MOUTH  TWICE A DAY   • metformin (GLUCOPHAGE) 1000 MG tablet Take 1 tablet by mouth 2 times daily (with meals).   • NIFEdipine XL (PROCARDIA XL) 90 MG 24 hr tablet Take 1 tablet by mouth daily.   • sitaGLIPtin (Januvia) 100 MG tablet Take 1 tablet by mouth daily.   • glipiZIDE (GLUCOTROL) 10 MG tablet TAKE 2 TABLETS BY MOUTH TWO TIMES DAILY   • triamterene-hydroCHLOROthiazide (MAXZIDE-25) 37.5-25 MG per tablet TAKE 1 TABLET BY MOUTH  EVERY DAY   • cyclobenzaprine (FLEXERIL) 10 MG tablet Take 1 tablet by mouth 3 times daily as needed for Muscle spasms.   • amoxicillin (AMOXIL) 500 MG tablet Take 1 tablet by mouth 2 times daily for 10 days.     No current facility-administered medications for this visit.        ALLERGIES:  ALLERGIES:   Allergen Reactions   • Bromaxefed Rf RASH   • No Name Available Other (See Comments)      Je Duffy Tabs-Unknown       PAST SURGICAL HISTORY:  History reviewed. No pertinent surgical history.    FAMILY HISTORY:  Family History   Problem Relation Age of Onset   • Atrial Fibrilliation Mother    • Asthma Mother    • Heart disease Father    • Multiple Sclerosis Sister    • Early death Brother    • Patient is unaware of any medical problems Sister    • Patient is unaware of any medical problems Sister    • Diabetes Brother    • Patient is unaware of any medical problems Brother    • Patient is unaware of any medical problems Brother    • Patient is unaware of any medical problems Brother        SOCIAL HISTORY:  Social History     Tobacco Use   • Smoking status: Never Smoker   • Smokeless tobacco: Never Used   Substance Use Topics   • Alcohol use: No     Frequency: Never   • Drug use: Not on file         Patient's medications, allergies, past medical, surgical, social and family histories were reviewed and updated as appropriate.  Patient's medications, allergies, past medical, surgical, and social history  were reviewed and updated as appropriate.    Review of Systems   Constitutional: Negative.    HENT: Positive for congestion and sore throat.    Eyes: Negative.    Respiratory: Negative.    Cardiovascular: Negative.    Gastrointestinal: Negative.    Genitourinary: Negative.    Musculoskeletal: Negative.    Skin: Negative.    Neurological: Negative.    Psychiatric/Behavioral: Negative.        Objective   Physical Exam  Vitals signs and nursing note reviewed.   Constitutional:       General: He is awake.      Appearance: Normal appearance. He is well-developed and well-groomed.   HENT:      Head: Normocephalic and atraumatic.      Right Ear: Hearing normal. There is impacted cerumen.      Left Ear: Hearing, tympanic membrane, ear canal and external ear normal.      Nose:      Right Sinus: No maxillary sinus tenderness or frontal sinus tenderness.      Left Sinus: Maxillary sinus tenderness present. No  frontal sinus tenderness.      Mouth/Throat:      Lips: Pink.      Mouth: Mucous membranes are moist.      Pharynx: Oropharynx is clear. Posterior oropharyngeal erythema present.   Eyes:      General: Lids are normal.      Extraocular Movements: Extraocular movements intact.      Conjunctiva/sclera: Conjunctivae normal.      Pupils: Pupils are equal, round, and reactive to light.   Cardiovascular:      Rate and Rhythm: Normal rate and regular rhythm.      Pulses: Normal pulses.      Heart sounds: Normal heart sounds, S1 normal and S2 normal.   Pulmonary:      Effort: Pulmonary effort is normal.      Breath sounds: Normal breath sounds.   Lymphadenopathy:      Head:      Right side of head: No submental, submandibular or tonsillar adenopathy.      Left side of head: No submental, submandibular or tonsillar adenopathy.   Skin:     General: Skin is warm.   Neurological:      General: No focal deficit present.      Mental Status: He is alert and oriented to person, place, and time.   Psychiatric:         Attention and Perception: Attention and perception normal.         Mood and Affect: Mood normal.         Speech: Speech normal.         Behavior: Behavior normal. Behavior is cooperative.         Thought Content: Thought content normal.         Cognition and Memory: Cognition normal.       Visit Vitals  /78   Pulse 73   Temp 97.5 °F (36.4 °C)   Resp 18   Ht 5' 9\" (1.753 m)   Wt (!) 142.9 kg (315 lb)   SpO2 94%   BMI 46.52 kg/m²       Assessment   Problem List Items Addressed This Visit     None      Visit Diagnoses     Acute streptococcal pharyngitis    -  Primary    Relevant Medications    amoxicillin (AMOXIL) 500 MG tablet    Sore throat        Relevant Orders    POCT RAPID STREP A (Completed)    Other fatigue        Relevant Orders    POCT RAPID STREP A (Completed)    Viral syndrome        Relevant Orders    POCT SARS-COV-2 ANTIGEN (Completed)    Impacted cerumen of right ear            Lab Results   Component  Value Date    RSTREP Positive (A) 12/26/2020     Lab Results   Component Value Date    COV2AG Not Detected 12/26/2020       Acute Streptococcal Pharyngitis   -Take medication as prescribed  -Change toothbrush in 48 hours   -Drink plenty of water   -Cool mist humidifier in home  -Over the counter saline nasal spray   -Over the counter multivitamin (Vitamin C, Vitamin D, Zinc)  -Over the counter Tylenol or Ibuprofen as needed   -Over the counter throat lozenge   -Good handwashing   -Continue to social distance   -Continue to wear facemask or face covering when outdoors    Cerumen Impaction   -OTC Debrox use as directed   -Avoid using Q-tips in ear canal   -Keep water out of ear canal   Instructions provided as documented in the AVS.    Thank you for visiting [facility].  Please follow up with your PCP x 1 week .   done

## 2022-05-25 NOTE — ASU PATIENT PROFILE, ADULT - FALL HARM RISK - HARM RISK INTERVENTIONS

## 2022-05-30 DIAGNOSIS — E78.00 PURE HYPERCHOLESTEROLEMIA, UNSPECIFIED: ICD-10-CM

## 2022-05-30 DIAGNOSIS — F31.9 BIPOLAR DISORDER, UNSPECIFIED: ICD-10-CM

## 2022-05-30 DIAGNOSIS — E03.9 HYPOTHYROIDISM, UNSPECIFIED: ICD-10-CM

## 2022-05-30 DIAGNOSIS — H26.9 UNSPECIFIED CATARACT: ICD-10-CM

## 2022-05-30 DIAGNOSIS — I10 ESSENTIAL (PRIMARY) HYPERTENSION: ICD-10-CM

## 2022-05-30 DIAGNOSIS — K21.9 GASTRO-ESOPHAGEAL REFLUX DISEASE WITHOUT ESOPHAGITIS: ICD-10-CM

## 2022-05-30 DIAGNOSIS — J44.9 CHRONIC OBSTRUCTIVE PULMONARY DISEASE, UNSPECIFIED: ICD-10-CM

## 2022-05-30 DIAGNOSIS — Z79.82 LONG TERM (CURRENT) USE OF ASPIRIN: ICD-10-CM

## 2022-05-30 DIAGNOSIS — D64.9 ANEMIA, UNSPECIFIED: ICD-10-CM

## 2022-05-30 DIAGNOSIS — Z91.011 ALLERGY TO MILK PRODUCTS: ICD-10-CM

## 2022-06-22 ENCOUNTER — OUTPATIENT (OUTPATIENT)
Dept: OUTPATIENT SERVICES | Facility: HOSPITAL | Age: 82
LOS: 1 days | Discharge: HOME | End: 2022-06-22

## 2022-06-22 VITALS — HEART RATE: 80 BPM | RESPIRATION RATE: 17 BRPM | DIASTOLIC BLOOD PRESSURE: 77 MMHG | SYSTOLIC BLOOD PRESSURE: 140 MMHG

## 2022-06-22 VITALS
TEMPERATURE: 97 F | RESPIRATION RATE: 17 BRPM | OXYGEN SATURATION: 98 % | DIASTOLIC BLOOD PRESSURE: 53 MMHG | WEIGHT: 132.94 LBS | SYSTOLIC BLOOD PRESSURE: 104 MMHG | HEART RATE: 56 BPM

## 2022-06-22 DIAGNOSIS — H26.9 UNSPECIFIED CATARACT: Chronic | ICD-10-CM

## 2022-06-22 RX ORDER — OMEPRAZOLE 10 MG/1
0 CAPSULE, DELAYED RELEASE ORAL
Qty: 30 | Refills: 0 | DISCHARGE

## 2022-06-22 RX ORDER — KETOROLAC TROMETHAMINE 0.5 %
0 DROPS OPHTHALMIC (EYE)
Qty: 0 | Refills: 0 | DISCHARGE

## 2022-06-22 RX ORDER — LAMOTRIGINE 25 MG/1
0 TABLET, ORALLY DISINTEGRATING ORAL
Qty: 60 | Refills: 0 | DISCHARGE

## 2022-06-22 RX ORDER — UMECLIDINIUM BROMIDE AND VILANTEROL TRIFENATATE 62.5; 25 UG/1; UG/1
1 POWDER RESPIRATORY (INHALATION)
Qty: 0 | Refills: 0 | DISCHARGE

## 2022-06-22 RX ORDER — PREGABALIN 225 MG/1
1 CAPSULE ORAL
Qty: 0 | Refills: 0 | DISCHARGE

## 2022-06-22 RX ORDER — LEVOTHYROXINE SODIUM 125 MCG
1 TABLET ORAL
Qty: 0 | Refills: 0 | DISCHARGE

## 2022-06-22 RX ORDER — METOPROLOL TARTRATE 50 MG
1 TABLET ORAL
Qty: 0 | Refills: 0 | DISCHARGE

## 2022-06-22 RX ORDER — IPRATROPIUM/ALBUTEROL SULFATE 18-103MCG
0 AEROSOL WITH ADAPTER (GRAM) INHALATION
Qty: 0 | Refills: 0 | DISCHARGE

## 2022-06-22 RX ORDER — LANOLIN ALCOHOL/MO/W.PET/CERES
1 CREAM (GRAM) TOPICAL
Qty: 0 | Refills: 0 | DISCHARGE

## 2022-06-22 RX ORDER — ARIPIPRAZOLE 15 MG/1
1 TABLET ORAL
Qty: 0 | Refills: 0 | DISCHARGE

## 2022-06-22 RX ORDER — LAMOTRIGINE 25 MG/1
1 TABLET, ORALLY DISINTEGRATING ORAL
Qty: 0 | Refills: 0 | DISCHARGE

## 2022-06-22 RX ORDER — ASPIRIN/CALCIUM CARB/MAGNESIUM 324 MG
1 TABLET ORAL
Qty: 0 | Refills: 0 | DISCHARGE

## 2022-06-22 RX ORDER — AMLODIPINE BESYLATE 2.5 MG/1
0 TABLET ORAL
Qty: 30 | Refills: 0 | DISCHARGE

## 2022-06-22 RX ORDER — LEVOTHYROXINE SODIUM 125 MCG
0 TABLET ORAL
Qty: 30 | Refills: 0 | DISCHARGE

## 2022-06-22 RX ORDER — MISOPROSTOL 200 UG/1
0 TABLET ORAL
Qty: 0 | Refills: 0 | DISCHARGE

## 2022-06-22 RX ORDER — FERROUS GLUCONATE 100 %
1 POWDER (GRAM) MISCELLANEOUS
Qty: 0 | Refills: 0 | DISCHARGE

## 2022-06-22 RX ORDER — PREDNISOLONE SODIUM PHOSPHATE 1 %
0 DROPS OPHTHALMIC (EYE)
Qty: 0 | Refills: 0 | DISCHARGE

## 2022-06-22 RX ORDER — OMEPRAZOLE 10 MG/1
1 CAPSULE, DELAYED RELEASE ORAL
Qty: 0 | Refills: 0 | DISCHARGE

## 2022-06-22 RX ORDER — AMLODIPINE BESYLATE 2.5 MG/1
1 TABLET ORAL
Qty: 0 | Refills: 0 | DISCHARGE

## 2022-06-22 NOTE — ASU PATIENT PROFILE, ADULT - FALL HARM RISK - HARM RISK INTERVENTIONS

## 2022-06-22 NOTE — ASU DISCHARGE PLAN (ADULT/PEDIATRIC) - NS MD DC FALL RISK RISK
For information on Fall & Injury Prevention, visit: [email or web address] OR  [email or web address] OR  https://www.cdc.gov/steadi/patient.html

## 2022-06-22 NOTE — ASU PATIENT PROFILE, ADULT - NSICDXPASTMEDICALHX_GEN_ALL_CORE_FT
PAST MEDICAL HISTORY:  Anemia     Bipolar 1 disorder     COPD (chronic obstructive pulmonary disease)     Dementia     GERD (gastroesophageal reflux disease)     Hypercholesterolemia     Hypothyroid     Insomnia     Schizophrenia      PAST MEDICAL HISTORY:  Anemia     Bipolar 1 disorder     COPD (chronic obstructive pulmonary disease)     Dementia     Former smoker quit 2000    GERD (gastroesophageal reflux disease)     Hypercholesterolemia     Hypothyroid     Insomnia     Schizophrenia

## 2022-06-22 NOTE — ASU PATIENT PROFILE, ADULT - NSICDXPASTSURGICALHX_GEN_ALL_CORE_FT
PAST SURGICAL HISTORY:  No significant past surgical history PAST SURGICAL HISTORY:  Right cataract

## 2022-06-22 NOTE — ASU PATIENT PROFILE, ADULT - NS PRO AD PATIENT TYPE
Additional Area 6 Units: 0 Do Not Resuscitate (DNR)/Medical Orders for Life-Sustaining Treatment (MOLST)

## 2022-06-24 DIAGNOSIS — Z91.011 ALLERGY TO MILK PRODUCTS: ICD-10-CM

## 2022-06-24 DIAGNOSIS — F31.9 BIPOLAR DISORDER, UNSPECIFIED: ICD-10-CM

## 2022-06-24 DIAGNOSIS — E03.9 HYPOTHYROIDISM, UNSPECIFIED: ICD-10-CM

## 2022-06-24 DIAGNOSIS — Z87.891 PERSONAL HISTORY OF NICOTINE DEPENDENCE: ICD-10-CM

## 2022-06-24 DIAGNOSIS — J44.9 CHRONIC OBSTRUCTIVE PULMONARY DISEASE, UNSPECIFIED: ICD-10-CM

## 2022-06-24 DIAGNOSIS — Z96.1 PRESENCE OF INTRAOCULAR LENS: ICD-10-CM

## 2022-06-24 DIAGNOSIS — Z98.41 CATARACT EXTRACTION STATUS, RIGHT EYE: ICD-10-CM

## 2022-06-24 DIAGNOSIS — H26.9 UNSPECIFIED CATARACT: ICD-10-CM

## 2022-06-24 DIAGNOSIS — F20.9 SCHIZOPHRENIA, UNSPECIFIED: ICD-10-CM

## 2022-06-24 DIAGNOSIS — Z79.82 LONG TERM (CURRENT) USE OF ASPIRIN: ICD-10-CM

## 2022-06-24 DIAGNOSIS — E78.00 PURE HYPERCHOLESTEROLEMIA, UNSPECIFIED: ICD-10-CM

## 2022-06-24 DIAGNOSIS — D64.9 ANEMIA, UNSPECIFIED: ICD-10-CM

## 2022-06-24 DIAGNOSIS — K21.9 GASTRO-ESOPHAGEAL REFLUX DISEASE WITHOUT ESOPHAGITIS: ICD-10-CM

## 2023-08-01 NOTE — PHYSICAL THERAPY INITIAL EVALUATION ADULT - ASSISTIVE DEVICE FOR TRANSFER: STAND/SIT, REHAB EVAL
rolling walker Cyclophosphamide Counseling:  I discussed with the patient the risks of cyclophosphamide including but not limited to hair loss, hormonal abnormalities, decreased fertility, abdominal pain, diarrhea, nausea and vomiting, bone marrow suppression and infection. The patient understands that monitoring is required while taking this medication.

## 2025-01-22 NOTE — ASU PATIENT PROFILE, ADULT - VISION (WITH CORRECTIVE LENSES IF THE PATIENT USUALLY WEARS THEM):
Normal vision: sees adequately in most situations; can see medication labels, newsprint 22-Jan-2025 14:57